# Patient Record
Sex: FEMALE | Race: WHITE | Employment: UNEMPLOYED | URBAN - METROPOLITAN AREA
[De-identification: names, ages, dates, MRNs, and addresses within clinical notes are randomized per-mention and may not be internally consistent; named-entity substitution may affect disease eponyms.]

---

## 2020-02-23 ENCOUNTER — ANESTHESIA (OUTPATIENT)
Dept: SURGERY | Age: 85
DRG: 470 | End: 2020-02-23
Payer: MEDICARE

## 2020-02-23 ENCOUNTER — ANESTHESIA EVENT (OUTPATIENT)
Dept: SURGERY | Age: 85
DRG: 470 | End: 2020-02-23
Payer: MEDICARE

## 2020-02-23 ENCOUNTER — HOSPITAL ENCOUNTER (INPATIENT)
Age: 85
LOS: 3 days | Discharge: NURSING, REHAB OR LONG-TERM CARE | DRG: 470 | End: 2020-02-26
Attending: EMERGENCY MEDICINE | Admitting: INTERNAL MEDICINE
Payer: MEDICARE

## 2020-02-23 ENCOUNTER — APPOINTMENT (OUTPATIENT)
Dept: GENERAL RADIOLOGY | Age: 85
DRG: 470 | End: 2020-02-23
Attending: EMERGENCY MEDICINE
Payer: MEDICARE

## 2020-02-23 DIAGNOSIS — S72.001A CLOSED FRACTURE OF RIGHT HIP, INITIAL ENCOUNTER (HCC): Primary | ICD-10-CM

## 2020-02-23 PROBLEM — S72.009A HIP FRACTURE (HCC): Status: ACTIVE | Noted: 2020-02-23

## 2020-02-23 PROBLEM — F03.90 DEMENTIA (HCC): Status: ACTIVE | Noted: 2020-02-23

## 2020-02-23 LAB
ABO + RH BLD: NORMAL
ALBUMIN SERPL-MCNC: 3.2 G/DL (ref 3.2–4.6)
ALBUMIN SERPL-MCNC: 3.2 G/DL (ref 3.2–4.6)
ALBUMIN/GLOB SERPL: 0.8 {RATIO} (ref 1.2–3.5)
ALBUMIN/GLOB SERPL: 0.9 {RATIO} (ref 1.2–3.5)
ALP SERPL-CCNC: 54 U/L (ref 50–136)
ALP SERPL-CCNC: 56 U/L (ref 50–136)
ALT SERPL-CCNC: 13 U/L (ref 12–65)
ALT SERPL-CCNC: 18 U/L (ref 12–65)
ANION GAP SERPL CALC-SCNC: 5 MMOL/L (ref 7–16)
ANION GAP SERPL CALC-SCNC: 6 MMOL/L (ref 7–16)
AST SERPL-CCNC: 16 U/L (ref 15–37)
AST SERPL-CCNC: 37 U/L (ref 15–37)
ATRIAL RATE: 91 BPM
BASOPHILS # BLD: 0 K/UL (ref 0–0.2)
BASOPHILS # BLD: 0.1 K/UL (ref 0–0.2)
BASOPHILS NFR BLD: 0 % (ref 0–2)
BASOPHILS NFR BLD: 1 % (ref 0–2)
BILIRUB SERPL-MCNC: 1.2 MG/DL (ref 0.2–1.1)
BILIRUB SERPL-MCNC: 1.4 MG/DL (ref 0.2–1.1)
BLOOD GROUP ANTIBODIES SERPL: NORMAL
BUN SERPL-MCNC: 7 MG/DL (ref 8–23)
BUN SERPL-MCNC: 9 MG/DL (ref 8–23)
CALCIUM SERPL-MCNC: 8.9 MG/DL (ref 8.3–10.4)
CALCIUM SERPL-MCNC: 8.9 MG/DL (ref 8.3–10.4)
CALCULATED P AXIS, ECG09: 63 DEGREES
CALCULATED R AXIS, ECG10: 29 DEGREES
CALCULATED T AXIS, ECG11: 79 DEGREES
CHLORIDE SERPL-SCNC: 105 MMOL/L (ref 98–107)
CHLORIDE SERPL-SCNC: 108 MMOL/L (ref 98–107)
CO2 SERPL-SCNC: 25 MMOL/L (ref 21–32)
CO2 SERPL-SCNC: 30 MMOL/L (ref 21–32)
CREAT SERPL-MCNC: 0.51 MG/DL (ref 0.6–1)
CREAT SERPL-MCNC: 0.58 MG/DL (ref 0.6–1)
DIAGNOSIS, 93000: NORMAL
DIFFERENTIAL METHOD BLD: ABNORMAL
DIFFERENTIAL METHOD BLD: ABNORMAL
EOSINOPHIL # BLD: 0.1 K/UL (ref 0–0.8)
EOSINOPHIL # BLD: 0.2 K/UL (ref 0–0.8)
EOSINOPHIL NFR BLD: 1 % (ref 0.5–7.8)
EOSINOPHIL NFR BLD: 1 % (ref 0.5–7.8)
ERYTHROCYTE [DISTWIDTH] IN BLOOD BY AUTOMATED COUNT: 13.7 % (ref 11.9–14.6)
ERYTHROCYTE [DISTWIDTH] IN BLOOD BY AUTOMATED COUNT: 13.7 % (ref 11.9–14.6)
ERYTHROCYTE [DISTWIDTH] IN BLOOD BY AUTOMATED COUNT: 13.8 % (ref 11.9–14.6)
GLOBULIN SER CALC-MCNC: 3.5 G/DL (ref 2.3–3.5)
GLOBULIN SER CALC-MCNC: 3.8 G/DL (ref 2.3–3.5)
GLUCOSE SERPL-MCNC: 106 MG/DL (ref 65–100)
GLUCOSE SERPL-MCNC: 116 MG/DL (ref 65–100)
HCT VFR BLD AUTO: 42 % (ref 35.8–46.3)
HCT VFR BLD AUTO: 43.4 % (ref 35.8–46.3)
HCT VFR BLD AUTO: 44.9 % (ref 35.8–46.3)
HGB BLD-MCNC: 14 G/DL (ref 11.7–15.4)
HGB BLD-MCNC: 14.6 G/DL (ref 11.7–15.4)
HGB BLD-MCNC: 14.7 G/DL (ref 11.7–15.4)
IMM GRANULOCYTES # BLD AUTO: 0 K/UL (ref 0–0.5)
IMM GRANULOCYTES # BLD AUTO: 0.1 K/UL (ref 0–0.5)
IMM GRANULOCYTES NFR BLD AUTO: 0 % (ref 0–5)
IMM GRANULOCYTES NFR BLD AUTO: 1 % (ref 0–5)
INR PPP: 1.1
LYMPHOCYTES # BLD: 1.1 K/UL (ref 0.5–4.6)
LYMPHOCYTES # BLD: 2 K/UL (ref 0.5–4.6)
LYMPHOCYTES NFR BLD: 11 % (ref 13–44)
LYMPHOCYTES NFR BLD: 15 % (ref 13–44)
MAGNESIUM SERPL-MCNC: 2.1 MG/DL (ref 1.8–2.4)
MCH RBC QN AUTO: 32.9 PG (ref 26.1–32.9)
MCH RBC QN AUTO: 33 PG (ref 26.1–32.9)
MCH RBC QN AUTO: 33.1 PG (ref 26.1–32.9)
MCHC RBC AUTO-ENTMCNC: 32.7 G/DL (ref 31.4–35)
MCHC RBC AUTO-ENTMCNC: 33.3 G/DL (ref 31.4–35)
MCHC RBC AUTO-ENTMCNC: 33.6 G/DL (ref 31.4–35)
MCV RBC AUTO: 100.7 FL (ref 79.6–97.8)
MCV RBC AUTO: 98.4 FL (ref 79.6–97.8)
MCV RBC AUTO: 98.8 FL (ref 79.6–97.8)
MONOCYTES # BLD: 0.5 K/UL (ref 0.1–1.3)
MONOCYTES # BLD: 0.7 K/UL (ref 0.1–1.3)
MONOCYTES NFR BLD: 5 % (ref 4–12)
MONOCYTES NFR BLD: 5 % (ref 4–12)
NEUTS SEG # BLD: 10 K/UL (ref 1.7–8.2)
NEUTS SEG # BLD: 8.2 K/UL (ref 1.7–8.2)
NEUTS SEG NFR BLD: 77 % (ref 43–78)
NEUTS SEG NFR BLD: 83 % (ref 43–78)
NRBC # BLD: 0 K/UL (ref 0–0.2)
P-R INTERVAL, ECG05: 196 MS
PLATELET # BLD AUTO: 219 K/UL (ref 150–450)
PLATELET # BLD AUTO: 247 K/UL (ref 150–450)
PLATELET # BLD AUTO: 262 K/UL (ref 150–450)
PMV BLD AUTO: 10.6 FL (ref 9.4–12.3)
PMV BLD AUTO: 10.7 FL (ref 9.4–12.3)
PMV BLD AUTO: 11.5 FL (ref 9.4–12.3)
POTASSIUM SERPL-SCNC: 3.7 MMOL/L (ref 3.5–5.1)
POTASSIUM SERPL-SCNC: 4.5 MMOL/L (ref 3.5–5.1)
PROT SERPL-MCNC: 6.7 G/DL (ref 6.3–8.2)
PROT SERPL-MCNC: 7 G/DL (ref 6.3–8.2)
PROTHROMBIN TIME: 14.2 SEC (ref 12–14.7)
Q-T INTERVAL, ECG07: 424 MS
QRS DURATION, ECG06: 84 MS
QTC CALCULATION (BEZET), ECG08: 521 MS
RBC # BLD AUTO: 4.25 M/UL (ref 4.05–5.2)
RBC # BLD AUTO: 4.41 M/UL (ref 4.05–5.2)
RBC # BLD AUTO: 4.46 M/UL (ref 4.05–5.2)
SODIUM SERPL-SCNC: 139 MMOL/L (ref 136–145)
SODIUM SERPL-SCNC: 140 MMOL/L (ref 136–145)
SPECIMEN EXP DATE BLD: NORMAL
VENTRICULAR RATE, ECG03: 91 BPM
WBC # BLD AUTO: 10 K/UL (ref 4.3–11.1)
WBC # BLD AUTO: 13 K/UL (ref 4.3–11.1)
WBC # BLD AUTO: 9.7 K/UL (ref 4.3–11.1)

## 2020-02-23 PROCEDURE — 86580 TB INTRADERMAL TEST: CPT | Performed by: ORTHOPAEDIC SURGERY

## 2020-02-23 PROCEDURE — 99284 EMERGENCY DEPT VISIT MOD MDM: CPT

## 2020-02-23 PROCEDURE — 74011250637 HC RX REV CODE- 250/637: Performed by: INTERNAL MEDICINE

## 2020-02-23 PROCEDURE — 74011000250 HC RX REV CODE- 250

## 2020-02-23 PROCEDURE — 36415 COLL VENOUS BLD VENIPUNCTURE: CPT

## 2020-02-23 PROCEDURE — 83735 ASSAY OF MAGNESIUM: CPT

## 2020-02-23 PROCEDURE — 77030002933 HC SUT MCRYL J&J -A: Performed by: ORTHOPAEDIC SURGERY

## 2020-02-23 PROCEDURE — 93005 ELECTROCARDIOGRAM TRACING: CPT | Performed by: EMERGENCY MEDICINE

## 2020-02-23 PROCEDURE — 77030018836 HC SOL IRR NACL ICUM -A: Performed by: ORTHOPAEDIC SURGERY

## 2020-02-23 PROCEDURE — 86900 BLOOD TYPING SEROLOGIC ABO: CPT

## 2020-02-23 PROCEDURE — 77030038269 HC DRN EXT URIN PURWCK BARD -A

## 2020-02-23 PROCEDURE — 51702 INSERT TEMP BLADDER CATH: CPT

## 2020-02-23 PROCEDURE — 71045 X-RAY EXAM CHEST 1 VIEW: CPT

## 2020-02-23 PROCEDURE — 77030041279 HC DRSG PRMSL AG MDII -B: Performed by: ORTHOPAEDIC SURGERY

## 2020-02-23 PROCEDURE — 80053 COMPREHEN METABOLIC PANEL: CPT

## 2020-02-23 PROCEDURE — 94760 N-INVAS EAR/PLS OXIMETRY 1: CPT

## 2020-02-23 PROCEDURE — 85027 COMPLETE CBC AUTOMATED: CPT

## 2020-02-23 PROCEDURE — 74011250637 HC RX REV CODE- 250/637: Performed by: ORTHOPAEDIC SURGERY

## 2020-02-23 PROCEDURE — 73552 X-RAY EXAM OF FEMUR 2/>: CPT

## 2020-02-23 PROCEDURE — 77030002937 HC SUT MERS J&J -B: Performed by: ORTHOPAEDIC SURGERY

## 2020-02-23 PROCEDURE — 74011250636 HC RX REV CODE- 250/636: Performed by: ANESTHESIOLOGY

## 2020-02-23 PROCEDURE — 74011000302 HC RX REV CODE- 302: Performed by: ORTHOPAEDIC SURGERY

## 2020-02-23 PROCEDURE — 0SRR0JZ REPLACEMENT OF RIGHT HIP JOINT, FEMORAL SURFACE WITH SYNTHETIC SUBSTITUTE, OPEN APPROACH: ICD-10-PCS | Performed by: ORTHOPAEDIC SURGERY

## 2020-02-23 PROCEDURE — L1830 KO IMMOB CANVAS LONG PRE OTS: HCPCS | Performed by: ORTHOPAEDIC SURGERY

## 2020-02-23 PROCEDURE — 74011250636 HC RX REV CODE- 250/636: Performed by: EMERGENCY MEDICINE

## 2020-02-23 PROCEDURE — 74011250636 HC RX REV CODE- 250/636: Performed by: ORTHOPAEDIC SURGERY

## 2020-02-23 PROCEDURE — 87641 MR-STAPH DNA AMP PROBE: CPT

## 2020-02-23 PROCEDURE — 76210000006 HC OR PH I REC 0.5 TO 1 HR: Performed by: ORTHOPAEDIC SURGERY

## 2020-02-23 PROCEDURE — 77030039425 HC BLD LARYNG TRULITE DISP TELE -A: Performed by: ANESTHESIOLOGY

## 2020-02-23 PROCEDURE — 96374 THER/PROPH/DIAG INJ IV PUSH: CPT

## 2020-02-23 PROCEDURE — 96375 TX/PRO/DX INJ NEW DRUG ADDON: CPT

## 2020-02-23 PROCEDURE — 74011000250 HC RX REV CODE- 250: Performed by: ORTHOPAEDIC SURGERY

## 2020-02-23 PROCEDURE — 76060000033 HC ANESTHESIA 1 TO 1.5 HR: Performed by: ORTHOPAEDIC SURGERY

## 2020-02-23 PROCEDURE — 73502 X-RAY EXAM HIP UNI 2-3 VIEWS: CPT

## 2020-02-23 PROCEDURE — 65660000000 HC RM CCU STEPDOWN

## 2020-02-23 PROCEDURE — 77030039267 HC ADH SKN EXOFIN S2SG -B: Performed by: ORTHOPAEDIC SURGERY

## 2020-02-23 PROCEDURE — 74011000258 HC RX REV CODE- 258: Performed by: ORTHOPAEDIC SURGERY

## 2020-02-23 PROCEDURE — 77030006835 HC BLD SAW SAG STRY -B: Performed by: ORTHOPAEDIC SURGERY

## 2020-02-23 PROCEDURE — 76010000161 HC OR TIME 1 TO 1.5 HR INTENSV-TIER 1: Performed by: ORTHOPAEDIC SURGERY

## 2020-02-23 PROCEDURE — 77030037088 HC TUBE ENDOTRACH ORAL NSL COVD-A: Performed by: ANESTHESIOLOGY

## 2020-02-23 PROCEDURE — 85610 PROTHROMBIN TIME: CPT

## 2020-02-23 PROCEDURE — C1776 JOINT DEVICE (IMPLANTABLE): HCPCS | Performed by: ORTHOPAEDIC SURGERY

## 2020-02-23 PROCEDURE — 77030031139 HC SUT VCRL2 J&J -A: Performed by: ORTHOPAEDIC SURGERY

## 2020-02-23 PROCEDURE — 74011000250 HC RX REV CODE- 250: Performed by: ANESTHESIOLOGY

## 2020-02-23 PROCEDURE — 77010033678 HC OXYGEN DAILY

## 2020-02-23 PROCEDURE — 85025 COMPLETE CBC W/AUTO DIFF WBC: CPT

## 2020-02-23 PROCEDURE — 77030013727 HC IRR FAN PULSVC ZIMM -B: Performed by: ORTHOPAEDIC SURGERY

## 2020-02-23 DEVICE — HEAD BPLR OD48MM ID28MM FEM HIP SELF CNTR: Type: IMPLANTABLE DEVICE | Site: HIP | Status: FUNCTIONAL

## 2020-02-23 DEVICE — HEAD FEM DIA28MM +5MM OFFSET STD 12/14 TAPR ARTC EZ HIP MTL: Type: IMPLANTABLE DEVICE | Site: HIP | Status: FUNCTIONAL

## 2020-02-23 RX ORDER — SODIUM CHLORIDE, SODIUM LACTATE, POTASSIUM CHLORIDE, CALCIUM CHLORIDE 600; 310; 30; 20 MG/100ML; MG/100ML; MG/100ML; MG/100ML
INJECTION, SOLUTION INTRAVENOUS
Status: DISCONTINUED | OUTPATIENT
Start: 2020-02-23 | End: 2020-02-23 | Stop reason: HOSPADM

## 2020-02-23 RX ORDER — LIDOCAINE HYDROCHLORIDE 20 MG/ML
INJECTION, SOLUTION EPIDURAL; INFILTRATION; INTRACAUDAL; PERINEURAL AS NEEDED
Status: DISCONTINUED | OUTPATIENT
Start: 2020-02-23 | End: 2020-02-23 | Stop reason: HOSPADM

## 2020-02-23 RX ORDER — SODIUM CHLORIDE 0.9 % (FLUSH) 0.9 %
5-40 SYRINGE (ML) INJECTION AS NEEDED
Status: DISCONTINUED | OUTPATIENT
Start: 2020-02-23 | End: 2020-02-25 | Stop reason: SDUPTHER

## 2020-02-23 RX ORDER — CEFAZOLIN SODIUM/WATER 2 G/20 ML
2 SYRINGE (ML) INTRAVENOUS ONCE
Status: DISCONTINUED | OUTPATIENT
Start: 2020-02-23 | End: 2020-02-23 | Stop reason: SDUPTHER

## 2020-02-23 RX ORDER — PROPOFOL 10 MG/ML
INJECTION, EMULSION INTRAVENOUS AS NEEDED
Status: DISCONTINUED | OUTPATIENT
Start: 2020-02-23 | End: 2020-02-23 | Stop reason: HOSPADM

## 2020-02-23 RX ORDER — MEMANTINE HYDROCHLORIDE 5 MG/1
10 TABLET ORAL 2 TIMES DAILY
Status: DISCONTINUED | OUTPATIENT
Start: 2020-02-23 | End: 2020-02-26 | Stop reason: HOSPADM

## 2020-02-23 RX ORDER — ONDANSETRON 2 MG/ML
4 INJECTION INTRAMUSCULAR; INTRAVENOUS
Status: COMPLETED | OUTPATIENT
Start: 2020-02-23 | End: 2020-02-23

## 2020-02-23 RX ORDER — ACETAMINOPHEN 325 MG/1
650 TABLET ORAL EVERY 8 HOURS
Status: DISCONTINUED | OUTPATIENT
Start: 2020-02-23 | End: 2020-02-26 | Stop reason: HOSPADM

## 2020-02-23 RX ORDER — SUCCINYLCHOLINE CHLORIDE 20 MG/ML
INJECTION INTRAMUSCULAR; INTRAVENOUS AS NEEDED
Status: DISCONTINUED | OUTPATIENT
Start: 2020-02-23 | End: 2020-02-23 | Stop reason: HOSPADM

## 2020-02-23 RX ORDER — SODIUM CHLORIDE, SODIUM LACTATE, POTASSIUM CHLORIDE, CALCIUM CHLORIDE 600; 310; 30; 20 MG/100ML; MG/100ML; MG/100ML; MG/100ML
25 INJECTION, SOLUTION INTRAVENOUS CONTINUOUS
Status: DISCONTINUED | OUTPATIENT
Start: 2020-02-23 | End: 2020-02-24

## 2020-02-23 RX ORDER — ONDANSETRON 2 MG/ML
4 INJECTION INTRAMUSCULAR; INTRAVENOUS
Status: DISCONTINUED | OUTPATIENT
Start: 2020-02-23 | End: 2020-02-26 | Stop reason: HOSPADM

## 2020-02-23 RX ORDER — MEMANTINE HYDROCHLORIDE 5 MG/1
5 TABLET ORAL 2 TIMES DAILY
Status: DISCONTINUED | OUTPATIENT
Start: 2020-02-23 | End: 2020-02-24

## 2020-02-23 RX ORDER — FENTANYL CITRATE 50 UG/ML
INJECTION, SOLUTION INTRAMUSCULAR; INTRAVENOUS AS NEEDED
Status: DISCONTINUED | OUTPATIENT
Start: 2020-02-23 | End: 2020-02-23 | Stop reason: HOSPADM

## 2020-02-23 RX ORDER — SODIUM CHLORIDE 0.9 % (FLUSH) 0.9 %
5-40 SYRINGE (ML) INJECTION EVERY 8 HOURS
Status: DISCONTINUED | OUTPATIENT
Start: 2020-02-23 | End: 2020-02-26 | Stop reason: HOSPADM

## 2020-02-23 RX ORDER — ROCURONIUM BROMIDE 10 MG/ML
INJECTION, SOLUTION INTRAVENOUS AS NEEDED
Status: DISCONTINUED | OUTPATIENT
Start: 2020-02-23 | End: 2020-02-23 | Stop reason: HOSPADM

## 2020-02-23 RX ORDER — HYDROCODONE BITARTRATE AND ACETAMINOPHEN 7.5; 325 MG/1; MG/1
1 TABLET ORAL
Status: DISCONTINUED | OUTPATIENT
Start: 2020-02-23 | End: 2020-02-25

## 2020-02-23 RX ORDER — MAG HYDROX/ALUMINUM HYD/SIMETH 200-200-20
30 SUSPENSION, ORAL (FINAL DOSE FORM) ORAL
Status: DISCONTINUED | OUTPATIENT
Start: 2020-02-23 | End: 2020-02-26 | Stop reason: HOSPADM

## 2020-02-23 RX ORDER — ACETAMINOPHEN 10 MG/ML
1000 INJECTION, SOLUTION INTRAVENOUS
Status: COMPLETED | OUTPATIENT
Start: 2020-02-23 | End: 2020-02-23

## 2020-02-23 RX ORDER — SODIUM CHLORIDE 9 MG/ML
75 INJECTION, SOLUTION INTRAVENOUS CONTINUOUS
Status: DISCONTINUED | OUTPATIENT
Start: 2020-02-23 | End: 2020-02-24

## 2020-02-23 RX ORDER — SODIUM CHLORIDE 0.9 % (FLUSH) 0.9 %
5-40 SYRINGE (ML) INJECTION EVERY 8 HOURS
Status: DISCONTINUED | OUTPATIENT
Start: 2020-02-23 | End: 2020-02-25 | Stop reason: SDUPTHER

## 2020-02-23 RX ORDER — OXYCODONE HYDROCHLORIDE 5 MG/1
10 TABLET ORAL
Status: DISCONTINUED | OUTPATIENT
Start: 2020-02-23 | End: 2020-02-25

## 2020-02-23 RX ORDER — METOPROLOL TARTRATE 5 MG/5ML
INJECTION INTRAVENOUS
Status: COMPLETED
Start: 2020-02-23 | End: 2020-02-23

## 2020-02-23 RX ORDER — SODIUM CHLORIDE, SODIUM LACTATE, POTASSIUM CHLORIDE, CALCIUM CHLORIDE 600; 310; 30; 20 MG/100ML; MG/100ML; MG/100ML; MG/100ML
50 INJECTION, SOLUTION INTRAVENOUS CONTINUOUS
Status: DISCONTINUED | OUTPATIENT
Start: 2020-02-23 | End: 2020-02-24

## 2020-02-23 RX ORDER — METOPROLOL TARTRATE 5 MG/5ML
2 INJECTION INTRAVENOUS ONCE
Status: COMPLETED | OUTPATIENT
Start: 2020-02-23 | End: 2020-02-23

## 2020-02-23 RX ORDER — OXYCODONE HYDROCHLORIDE 5 MG/1
5 TABLET ORAL
Status: DISCONTINUED | OUTPATIENT
Start: 2020-02-23 | End: 2020-02-25

## 2020-02-23 RX ORDER — MORPHINE SULFATE 2 MG/ML
2 INJECTION, SOLUTION INTRAMUSCULAR; INTRAVENOUS
Status: COMPLETED | OUTPATIENT
Start: 2020-02-23 | End: 2020-02-23

## 2020-02-23 RX ORDER — ENOXAPARIN SODIUM 100 MG/ML
30 INJECTION SUBCUTANEOUS DAILY
Status: DISCONTINUED | OUTPATIENT
Start: 2020-02-24 | End: 2020-02-26 | Stop reason: HOSPADM

## 2020-02-23 RX ORDER — HYDROMORPHONE HYDROCHLORIDE 1 MG/ML
0.2 INJECTION, SOLUTION INTRAMUSCULAR; INTRAVENOUS; SUBCUTANEOUS
Status: DISCONTINUED | OUTPATIENT
Start: 2020-02-23 | End: 2020-02-25

## 2020-02-23 RX ORDER — CEFAZOLIN SODIUM/WATER 2 G/20 ML
SYRINGE (ML) INTRAVENOUS AS NEEDED
Status: DISCONTINUED | OUTPATIENT
Start: 2020-02-23 | End: 2020-02-23 | Stop reason: HOSPADM

## 2020-02-23 RX ORDER — DONEPEZIL HYDROCHLORIDE 5 MG/1
10 TABLET, FILM COATED ORAL
Status: DISCONTINUED | OUTPATIENT
Start: 2020-02-23 | End: 2020-02-24

## 2020-02-23 RX ORDER — FERROUS SULFATE, DRIED 160(50) MG
1 TABLET, EXTENDED RELEASE ORAL
Status: DISCONTINUED | OUTPATIENT
Start: 2020-02-23 | End: 2020-02-26 | Stop reason: HOSPADM

## 2020-02-23 RX ORDER — ACETAMINOPHEN 325 MG/1
650 TABLET ORAL
Status: DISCONTINUED | OUTPATIENT
Start: 2020-02-23 | End: 2020-02-26 | Stop reason: HOSPADM

## 2020-02-23 RX ORDER — HYDROMORPHONE HYDROCHLORIDE 1 MG/ML
0.5 INJECTION, SOLUTION INTRAMUSCULAR; INTRAVENOUS; SUBCUTANEOUS
Status: DISCONTINUED | OUTPATIENT
Start: 2020-02-23 | End: 2020-02-25

## 2020-02-23 RX ORDER — SODIUM CHLORIDE 0.9 % (FLUSH) 0.9 %
5-40 SYRINGE (ML) INJECTION AS NEEDED
Status: DISCONTINUED | OUTPATIENT
Start: 2020-02-23 | End: 2020-02-26 | Stop reason: HOSPADM

## 2020-02-23 RX ORDER — SODIUM CHLORIDE 9 MG/ML
100 INJECTION, SOLUTION INTRAVENOUS CONTINUOUS
Status: DISPENSED | OUTPATIENT
Start: 2020-02-23 | End: 2020-02-24

## 2020-02-23 RX ADMIN — Medication 1 TABLET: at 19:07

## 2020-02-23 RX ADMIN — ACETAMINOPHEN 650 MG: 325 TABLET, FILM COATED ORAL at 19:06

## 2020-02-23 RX ADMIN — TUBERCULIN PURIFIED PROTEIN DERIVATIVE 5 UNITS: 5 INJECTION, SOLUTION INTRADERMAL at 23:59

## 2020-02-23 RX ADMIN — Medication 5 ML: at 21:57

## 2020-02-23 RX ADMIN — SODIUM CHLORIDE 1000 MG: 900 INJECTION, SOLUTION INTRAVENOUS at 21:57

## 2020-02-23 RX ADMIN — Medication 5 ML: at 19:10

## 2020-02-23 RX ADMIN — FENTANYL CITRATE 25 MCG: 50 INJECTION INTRAMUSCULAR; INTRAVENOUS at 14:48

## 2020-02-23 RX ADMIN — PHENYLEPHRINE HYDROCHLORIDE 100 MCG: 10 INJECTION INTRAVENOUS at 13:48

## 2020-02-23 RX ADMIN — ROCURONIUM BROMIDE 10 MG: 10 INJECTION, SOLUTION INTRAVENOUS at 13:45

## 2020-02-23 RX ADMIN — MEMANTINE 10 MG: 5 TABLET ORAL at 19:06

## 2020-02-23 RX ADMIN — METOPROLOL TARTRATE 2 MG: 5 INJECTION INTRAVENOUS at 15:10

## 2020-02-23 RX ADMIN — MEMANTINE 10 MG: 5 TABLET ORAL at 19:10

## 2020-02-23 RX ADMIN — PROPOFOL 70 MG: 10 INJECTION, EMULSION INTRAVENOUS at 13:43

## 2020-02-23 RX ADMIN — SUCCINYLCHOLINE CHLORIDE 140 MG: 20 INJECTION, SOLUTION INTRAMUSCULAR; INTRAVENOUS at 13:43

## 2020-02-23 RX ADMIN — ONDANSETRON 4 MG: 2 INJECTION INTRAMUSCULAR; INTRAVENOUS at 08:42

## 2020-02-23 RX ADMIN — MEMANTINE 5 MG: 5 TABLET ORAL at 19:07

## 2020-02-23 RX ADMIN — PHENYLEPHRINE HYDROCHLORIDE 100 MCG: 10 INJECTION INTRAVENOUS at 13:44

## 2020-02-23 RX ADMIN — ROCURONIUM BROMIDE 5 MG: 10 INJECTION, SOLUTION INTRAVENOUS at 13:43

## 2020-02-23 RX ADMIN — MORPHINE SULFATE 2 MG: 2 INJECTION, SOLUTION INTRAMUSCULAR; INTRAVENOUS at 08:42

## 2020-02-23 RX ADMIN — SODIUM CHLORIDE, SODIUM LACTATE, POTASSIUM CHLORIDE, AND CALCIUM CHLORIDE 25 ML/HR: 600; 310; 30; 20 INJECTION, SOLUTION INTRAVENOUS at 12:56

## 2020-02-23 RX ADMIN — ACETAMINOPHEN 1000 MG: 10 INJECTION, SOLUTION INTRAVENOUS at 15:09

## 2020-02-23 RX ADMIN — LIDOCAINE HYDROCHLORIDE 100 MG: 20 INJECTION, SOLUTION EPIDURAL; INFILTRATION; INTRACAUDAL; PERINEURAL at 13:43

## 2020-02-23 RX ADMIN — SODIUM CHLORIDE, SODIUM LACTATE, POTASSIUM CHLORIDE, AND CALCIUM CHLORIDE: 600; 310; 30; 20 INJECTION, SOLUTION INTRAVENOUS at 13:49

## 2020-02-23 RX ADMIN — FENTANYL CITRATE 25 MCG: 50 INJECTION INTRAMUSCULAR; INTRAVENOUS at 14:34

## 2020-02-23 RX ADMIN — Medication 2 G: at 13:46

## 2020-02-23 RX ADMIN — DONEPEZIL HYDROCHLORIDE 10 MG: 5 TABLET, FILM COATED ORAL at 21:57

## 2020-02-23 RX ADMIN — FENTANYL CITRATE 50 MCG: 50 INJECTION INTRAMUSCULAR; INTRAVENOUS at 14:06

## 2020-02-23 RX ADMIN — SODIUM CHLORIDE, SODIUM LACTATE, POTASSIUM CHLORIDE, AND CALCIUM CHLORIDE 50 ML/HR: 600; 310; 30; 20 INJECTION, SOLUTION INTRAVENOUS at 19:08

## 2020-02-23 NOTE — ED NOTES
Multiple unsuccessful attempts for Gray catheter placement by this RN and 2 other RNs due to patient's pain due to fracture. Purewick placed.
Patient remains in radiology at this time.
Patient reports received from Trisha Ace, [address] [place] [address]. Care assumed at this time.
Pt to xray in stretcher with transport at this time.
TRANSFER - OUT REPORT:    Verbal report given to Mohit Cruz RN (name) on Gala Kovacs  being transferred to 716(unit) for routine progression of care       Report consisted of patients Situation, Background, Assessment and   Recommendations(SBAR). Information from the following report(s) SBAR, ED Summary and MAR was reviewed with the receiving nurse. Lines:   Peripheral IV 02/23/20 Right Hand (Active)   Site Assessment Clean, dry, & intact 2/23/2020  8:07 AM   Phlebitis Assessment 0 2/23/2020  8:07 AM   Infiltration Assessment 0 2/23/2020  8:07 AM   Dressing Status Clean, dry, & intact 2/23/2020  8:07 AM        Opportunity for questions and clarification was provided.
This RN with two other RN's with multiple attempts at jones catheter. Unsuccessful. Purewick placed on pt at this time.
Verbal report to Nataly Welsh RN for continuation of care. Care transferred at this time.
[id number]

## 2020-02-23 NOTE — CONSULTS
FRACTURE CONSULT NOTE    Subjective:     Date of Consultation:  February 23, 2020  Referring Physician:  Krystal Jane    This is an 51-year-old female patient with a past medical history of dementia, aortic stenosis, resident of a nursing home who had a mechanical fall on 2/22/2020. After the fall it was noticed that the patient was not able to move the right lower extremity and it was internally rotated and shortened. A chest x-ray was done today and reported a right hip fracture. The patient was sent into the emergency room to be evaluated here.     She was found to have a blood pressure of 181/98, heart rate of 97, respiratory rate of 18, O2 saturation of 96% at room air. The patient is hard of hearing. Her lungs were clear to auscultation. He had a regular rate and rhythm. Her abdomen was soft and nontender. Her right lower extremity was internally rotated and shortened. Her initial blood work-up reported normal white blood cell count, stable hemoglobin, stable electrolytes, creatinine of 0.5, bilirubin of 1.4, normal liver enzymes, chest x-ray showed no evidence of acute intrathoracic abnormality. A right hip x-ray showed right proximal femur basicervical femoral neck fracture. The patient was presented to the hospitalist team for admission. Long discussion with family regarding operative and non operative care  Patient Active Problem List    Diagnosis Date Noted    Hip fracture ([facility] [address] 02/23/2020    Dementia ([facility] [address] 02/23/2020    Aortic valve stenosis 05/12/2016     History reviewed. No pertinent family history. Social History     Tobacco Use    Smoking status: Never Smoker    Smokeless tobacco: Never Used   Substance Use Topics    Alcohol use: No     Alcohol/week: 0.0 standard drinks     Past Medical History:   Diagnosis Date    Aortic stenosis     Aortic valve stenosis 5/12/2016      History reviewed. No pertinent surgical history.    Prior to Admission medications    Medication Sig Start Date End Date Taking? Authorizing Provider   memantine ER (NAMENDA XR) 28 mg capsule Take  by mouth daily. Provider, Historical   donepezil (ARICEPT) 10 mg tablet Take 10 mg by mouth nightly. Provider, Historical   risedronate (ACTONEL) 150 mg tablet Take 150 mg by mouth every thirty (30) days.     Provider, Historical     Current Facility-Administered Medications   Medication Dose Route Frequency    memantine (NAMENDA) tablet 10 mg  10 mg Oral BID    sodium chloride 0.9 % bolus infusion 500 mL  500 mL IntraVENous ONCE    0.9% sodium chloride infusion  100 mL/hr IntraVENous CONTINUOUS    ondansetron (ZOFRAN) injection 4 mg  4 mg IntraVENous Q6H PRN    sodium chloride 0.9 % bolus infusion 250 mL  250 mL IntraVENous CONTINUOUS    sodium chloride (NS) flush 5-40 mL  5-40 mL IntraVENous Q8H    sodium chloride (NS) flush 5-40 mL  5-40 mL IntraVENous PRN    acetaminophen (TYLENOL) tablet 650 mg  650 mg Oral Q6H PRN    tuberculin injection 5 Units  5 Units IntraDERMal ONCE    oxyCODONE IR (ROXICODONE) tablet 5 mg  5 mg Oral Q6H PRN    HYDROmorphone (PF) (DILAUDID) injection 0.5 mg  0.5 mg IntraVENous Q3H PRN    famotidine (PF) (PEPCID) 20 mg in 0.9% sodium chloride 10 mL injection  20 mg IntraVENous Q24H      No Known Allergies     Review of Systems:  10 point ROS done and is negative except as noted in HPI      Mental Status: severe dementia    Objective:     Patient Vitals for the past 8 hrs:   BP Temp Pulse Resp SpO2 Height Weight   20 1228     97 %     20 1136 (!) 193/94 99.4 °F (37.4 °C) 94 19 93 %     20 1105     97 %     20 1104   94  96 %     20 1058 156/85  96  90 %     20 0831 (!) 159/101  92 20      20 0830   98 22 93 %     20 0802 (!) 181/98 98.4 °F (36.9 °C) 97 18 96 % 4' 11\" (1.499 m) 55.3 kg (122 lb)     Temp (24hrs), Av.9 °F (37.2 °C), Min:98.4 °F (36.9 °C), Max:99.4 °F (37.4 °C)      EXAM:   General:   Demented Eyes:  Sclera anicteric. Mouth/Throat: Mucous membranes normal, oral pharynx clear   Neck: Supple   Lungs:   Clear to auscultation bilaterally, good effort   CV:  Regular rate and rhythm,no murmur, click, rub or gallop   Abdomen:   Soft, non-tender. bowel sounds normal. non-distended   Extremities: No cyanosis or edema   Skin: Skin color, texture, turgor normal. no acute rash or lesions   Lymph nodes: Cervical and supraclavicular normal   Musculoskeletal:  Right hip painful. Leg externally rotated and short   Lines/Devices:     Psych:          Imaging Review: markedly displaced femoral neck    Labs:   Recent Results (from the past 24 hour(s))   CBC WITH AUTOMATED DIFF    Collection Time: 02/23/20  8:09 AM   Result Value Ref Range    WBC 10.0 4.3 - 11.1 K/uL    RBC 4.41 4.05 - 5.2 M/uL    HGB 14.6 11.7 - 15.4 g/dL    HCT 43.4 35.8 - 46.3 %    MCV 98.4 (H) 79.6 - 97.8 FL    MCH 33.1 (H) 26.1 - 32.9 PG    MCHC 33.6 31.4 - 35.0 g/dL    RDW 13.8 11.9 - 14.6 %    PLATELET 130 092 - 166 K/uL    MPV 11.5 9.4 - 12.3 FL    ABSOLUTE NRBC 0.00 0.0 - 0.2 K/uL    DF AUTOMATED      NEUTROPHILS 83 (H) 43 - 78 %    LYMPHOCYTES 11 (L) 13 - 44 %    MONOCYTES 5 4.0 - 12.0 %    EOSINOPHILS 1 0.5 - 7.8 %    BASOPHILS 0 0.0 - 2.0 %    IMMATURE GRANULOCYTES 0 0.0 - 5.0 %    ABS. NEUTROPHILS 8.2 1.7 - 8.2 K/UL    ABS. LYMPHOCYTES 1.1 0.5 - 4.6 K/UL    ABS. MONOCYTES 0.5 0.1 - 1.3 K/UL    ABS. EOSINOPHILS 0.1 0.0 - 0.8 K/UL    ABS. BASOPHILS 0.0 0.0 - 0.2 K/UL    ABS. IMM.  GRANS. 0.0 0.0 - 0.5 K/UL   METABOLIC PANEL, COMPREHENSIVE    Collection Time: 02/23/20  8:09 AM   Result Value Ref Range    Sodium 139 136 - 145 mmol/L    Potassium 4.5 3.5 - 5.1 mmol/L    Chloride 108 (H) 98 - 107 mmol/L    CO2 25 21 - 32 mmol/L    Anion gap 6 (L) 7 - 16 mmol/L    Glucose 106 (H) 65 - 100 mg/dL    BUN 7 (L) 8 - 23 MG/DL    Creatinine 0.51 (L) 0.6 - 1.0 MG/DL    GFR est AA >60 >60 ml/min/1.73m2    GFR est non-AA >60 >60 ml/min/1.73m2    Calcium 8.9 8.3 - 10.4 MG/DL    Bilirubin, total 1.4 (H) 0.2 - 1.1 MG/DL    ALT (SGPT) 18 12 - 65 U/L    AST (SGOT) 37 15 - 37 U/L    Alk. phosphatase 54 50 - 136 U/L    Protein, total 7.0 6.3 - 8.2 g/dL    Albumin 3.2 3.2 - 4.6 g/dL    Globulin 3.8 (H) 2.3 - 3.5 g/dL    A-G Ratio 0.8 (L) 1.2 - 3.5     PROTHROMBIN TIME + INR    Collection Time: 02/23/20  8:09 AM   Result Value Ref Range    Prothrombin time 14.2 12.0 - 14.7 sec    INR 1.1     TYPE & SCREEN    Collection Time: 02/23/20  8:09 AM   Result Value Ref Range    Crossmatch Expiration 02/26/2020     ABO/Rh(D) A POSITIVE     Antibody screen NEG    EKG, 12 LEAD, INITIAL    Collection Time: 02/23/20  8:10 AM   Result Value Ref Range    Ventricular Rate 91 BPM    Atrial Rate 91 BPM    P-R Interval 196 ms    QRS Duration 84 ms    Q-T Interval 424 ms    QTC Calculation (Bezet) 521 ms    Calculated P Axis 63 degrees    Calculated R Axis 29 degrees    Calculated T Axis 79 degrees    Diagnosis       !! AGE AND GENDER SPECIFIC ECG ANALYSIS !! Normal sinus rhythm  Left ventricular hypertrophy with repolarization abnormality  Septal infarct , age undetermined  Prolonged QT  Abnormal ECG  No previous ECGs available  Confirmed by Ladd Boeck MD (), HERNANDEZ VARGAS ([id number]) on 2/23/2020 9:46:17 AM           Impression:     Active Problems:    Hip fracture ([facility] [address] (2/23/2020)      Dementia ([facility] [address] (2/23/2020)        Plan:     Hemiarthroplasty of right hip  Discussed risks and alternatives. Family desires surgery although long term prognosis is poor.   Will proceed    Miley Beauchamp DO

## 2020-02-23 NOTE — ED PROVIDER NOTES
Patient with fall yesterday from nursing home. X-ray today shows right hip fracture. Patient has shortening and rotation of the right leg. Sent here for evaluation. The history is provided by the patient. No  was used. Hip Pain    This is a new problem. The current episode started yesterday. The problem occurs constantly. The problem has not changed since onset. The pain is present in the right hip. The quality of the pain is described as aching. The pain is mild. Associated symptoms include limited range of motion. The symptoms are aggravated by palpation. She has tried nothing for the symptoms. There has been a history of trauma. Past Medical History:   Diagnosis Date    Aortic stenosis     Aortic valve stenosis 5/12/2016       History reviewed. No pertinent surgical history. History reviewed. No pertinent family history.     Social History     Socioeconomic History    Marital status: UNKNOWN     Spouse name: Not on file    Number of children: Not on file    Years of education: Not on file    Highest education level: Not on file   Occupational History    Not on file   Social Needs    Financial resource strain: Not on file    Food insecurity:     Worry: Not on file     Inability: Not on file    Transportation needs:     Medical: Not on file     Non-medical: Not on file   Tobacco Use    Smoking status: Never Smoker    Smokeless tobacco: Never Used   Substance and Sexual Activity    Alcohol use: No     Alcohol/week: 0.0 standard drinks    Drug use: Not on file    Sexual activity: Not on file   Lifestyle    Physical activity:     Days per week: Not on file     Minutes per session: Not on file    Stress: Not on file   Relationships    Social connections:     Talks on phone: Not on file     Gets together: Not on file     Attends Oriental orthodox service: Not on file     Active member of club or organization: Not on file     Attends meetings of clubs or organizations: Not on file     Relationship status: Not on file    Intimate partner violence:     Fear of current or ex partner: Not on file     Emotionally abused: Not on file     Physically abused: Not on file     Forced sexual activity: Not on file   Other Topics Concern    Not on file   Social History Narrative    Not on file         ALLERGIES: Patient has no known allergies. Review of Systems   Unable to perform ROS: Dementia       Vitals:    02/23/20 0802   BP: (!) 181/98   Pulse: 97   Resp: 18   Temp: 98.4 °F (36.9 °C)   SpO2: 96%   Weight: 55.3 kg (122 lb)   Height: 4' 11\" (1.499 m)            Physical Exam  Constitutional:       Appearance: Normal appearance. She is well-developed. HENT:      Head: Normocephalic and atraumatic. Eyes:      Extraocular Movements: Extraocular movements intact. Pupils: Pupils are equal, round, and reactive to light. Neck:      Musculoskeletal: Normal range of motion and neck supple. Cardiovascular:      Rate and Rhythm: Normal rate and regular rhythm. Pulmonary:      Effort: Pulmonary effort is normal.      Breath sounds: Normal breath sounds. Abdominal:      General: Bowel sounds are normal.      Palpations: Abdomen is soft. Tenderness: There is no abdominal tenderness. Musculoskeletal:         General: Tenderness (right hip) present. Skin:     General: Skin is warm and dry. Neurological:      General: No focal deficit present. Mental Status: She is alert. Mental status is at baseline. MDM  Number of Diagnoses or Management Options  Diagnosis management comments: Patient with right hip fracture status post fall yesterday. Will admit for further treatment.        Amount and/or Complexity of Data Reviewed  Clinical lab tests: ordered and reviewed  Tests in the radiology section of CPT®: ordered and reviewed  Tests in the medicine section of CPT®: ordered and reviewed    Patient Progress  Patient progress: stable         Procedures      EKG: normal sinus rhythm, nonspecific ST and T waves changes. XR HIP RT W OR WO PELV 2-3 VWS (Final result)   Result time 02/23/20 09:35:24   Final result by Elba Clayton MD (02/23/20 09:35:24)                Impression:    IMPRESSION:  1. Right proximal femur basicervical femoral neck fracture. 2. No acute cardiopulmonary abnormality. Cardiomegaly suspected. Narrative:    Right hip, right femur, and chest x-ray    Clinical location right hip pain after a fall    FINDINGS:    Right hip: A bases cervical right proximal femoral neck fracture is appreciated  with superior migration of the diaphyseal fragment and varus angulation. Right femur: Four views of the right femur again show the right proximal femoral  neck fracture. No mid or distal femur fracture evident. The soft tissues are  unremarkable. Portable chest x-ray: The cardiac silhouette is prominent. The lungs are  expanded and clear. No pleural effusion or pneumothorax. The bones are  osteopenic.                    XR FEMUR RT 2 VS (Final result)   Result time 02/23/20 09:35:24   Final result by Elba Clayton MD (02/23/20 09:35:24)                Impression:    IMPRESSION:  1. Right proximal femur basicervical femoral neck fracture. 2. No acute cardiopulmonary abnormality. Cardiomegaly suspected. Narrative:    Right hip, right femur, and chest x-ray    Clinical location right hip pain after a fall    FINDINGS:    Right hip: A bases cervical right proximal femoral neck fracture is appreciated  with superior migration of the diaphyseal fragment and varus angulation. Right femur: Four views of the right femur again show the right proximal femoral  neck fracture. No mid or distal femur fracture evident. The soft tissues are  unremarkable. Portable chest x-ray: The cardiac silhouette is prominent. The lungs are  expanded and clear. No pleural effusion or pneumothorax.  The bones are  osteopenic.                    XR CHEST SNGL V (Final result)   Result time 02/23/20 09:35:24   Procedure changed from XR CHEST PA LAT   Final result by Yani Ramirez MD (02/23/20 09:35:24)                Impression:    IMPRESSION:  1. Right proximal femur basicervical femoral neck fracture. 2. No acute cardiopulmonary abnormality. Cardiomegaly suspected. Narrative:    Right hip, right femur, and chest x-ray    Clinical location right hip pain after a fall    FINDINGS:    Right hip: A bases cervical right proximal femoral neck fracture is appreciated  with superior migration of the diaphyseal fragment and varus angulation. Right femur: Four views of the right femur again show the right proximal femoral  neck fracture. No mid or distal femur fracture evident. The soft tissues are  unremarkable. Portable chest x-ray: The cardiac silhouette is prominent. The lungs are  expanded and clear. No pleural effusion or pneumothorax. The bones are  osteopenic.                  Results Include:    Recent Results (from the past 24 hour(s))   CBC WITH AUTOMATED DIFF    Collection Time: 02/23/20  8:09 AM   Result Value Ref Range    WBC 10.0 4.3 - 11.1 K/uL    RBC 4.41 4.05 - 5.2 M/uL    HGB 14.6 11.7 - 15.4 g/dL    HCT 43.4 35.8 - 46.3 %    MCV 98.4 (H) 79.6 - 97.8 FL    MCH 33.1 (H) 26.1 - 32.9 PG    MCHC 33.6 31.4 - 35.0 g/dL    RDW 13.8 11.9 - 14.6 %    PLATELET 215 526 - 076 K/uL    MPV 11.5 9.4 - 12.3 FL    ABSOLUTE NRBC 0.00 0.0 - 0.2 K/uL    DF AUTOMATED      NEUTROPHILS 83 (H) 43 - 78 %    LYMPHOCYTES 11 (L) 13 - 44 %    MONOCYTES 5 4.0 - 12.0 %    EOSINOPHILS 1 0.5 - 7.8 %    BASOPHILS 0 0.0 - 2.0 %    IMMATURE GRANULOCYTES 0 0.0 - 5.0 %    ABS. NEUTROPHILS 8.2 1.7 - 8.2 K/UL    ABS. LYMPHOCYTES 1.1 0.5 - 4.6 K/UL    ABS. MONOCYTES 0.5 0.1 - 1.3 K/UL    ABS. EOSINOPHILS 0.1 0.0 - 0.8 K/UL    ABS. BASOPHILS 0.0 0.0 - 0.2 K/UL    ABS. IMM.  GRANS. 0.0 0.0 - 0.5 K/UL   METABOLIC PANEL, COMPREHENSIVE    Collection Time: 02/23/20  8:09 AM Result Value Ref Range    Sodium 139 136 - 145 mmol/L    Potassium 4.5 3.5 - 5.1 mmol/L    Chloride 108 (H) 98 - 107 mmol/L    CO2 25 21 - 32 mmol/L    Anion gap 6 (L) 7 - 16 mmol/L    Glucose 106 (H) 65 - 100 mg/dL    BUN 7 (L) 8 - 23 MG/DL    Creatinine 0.51 (L) 0.6 - 1.0 MG/DL    GFR est AA >60 >60 ml/min/1.73m2    GFR est non-AA >60 >60 ml/min/1.73m2    Calcium 8.9 8.3 - 10.4 MG/DL    Bilirubin, total 1.4 (H) 0.2 - 1.1 MG/DL    ALT (SGPT) 18 12 - 65 U/L    AST (SGOT) 37 15 - 37 U/L    Alk. phosphatase 54 50 - 136 U/L    Protein, total 7.0 6.3 - 8.2 g/dL    Albumin 3.2 3.2 - 4.6 g/dL    Globulin 3.8 (H) 2.3 - 3.5 g/dL    A-G Ratio 0.8 (L) 1.2 - 3.5     PROTHROMBIN TIME + INR    Collection Time: 02/23/20  8:09 AM   Result Value Ref Range    Prothrombin time 14.2 12.0 - 14.7 sec    INR 1.1     TYPE & SCREEN    Collection Time: 02/23/20  8:09 AM   Result Value Ref Range    Crossmatch Expiration 02/26/2020     ABO/Rh(D) A POSITIVE     Antibody screen NEG    EKG, 12 LEAD, INITIAL    Collection Time: 02/23/20  8:10 AM   Result Value Ref Range    Ventricular Rate 91 BPM    Atrial Rate 91 BPM    P-R Interval 196 ms    QRS Duration 84 ms    Q-T Interval 424 ms    QTC Calculation (Bezet) 521 ms    Calculated P Axis 63 degrees    Calculated R Axis 29 degrees    Calculated T Axis 79 degrees    Diagnosis       !! AGE AND GENDER SPECIFIC ECG ANALYSIS !!   Normal sinus rhythm  Left ventricular hypertrophy with repolarization abnormality  Septal infarct , age undetermined  Prolonged QT  Abnormal ECG  No previous ECGs available  Confirmed by Reji Mccord MD (), HERNANDEZ VARGAS ([id number]) on 2/23/2020 9:46:17 AM

## 2020-02-23 NOTE — ANESTHESIA PREPROCEDURE EVALUATION
Relevant Problems   No relevant active problems       Anesthetic History               Review of Systems / Medical History      Pulmonary                   Neuro/Psych              Cardiovascular    Hypertension: poorly controlled  Valvular problems/murmurs: aortic stenosis            Exercise tolerance: <4 METS  Comments: Aortic stenosis ?  severity   GI/Hepatic/Renal                Endo/Other             Other Findings              Physical Exam    Airway  Mallampati: I  TM Distance: > 6 cm  Neck ROM: normal range of motion   Mouth opening: Normal     Cardiovascular    Rhythm: regular  Rate: abnormal         Dental    Dentition: Full lower dentures and Full upper dentures     Pulmonary    Rhonchi:bibasilar             Abdominal         Other Findings            Anesthetic Plan    ASA: 3  Anesthesia type: general            Anesthetic plan and risks discussed with: Patient      SUSY

## 2020-02-23 NOTE — H&P
HOSPITALIST H&P/CONSULT  NAME:  Susannah Reasons   Age:  80 y.o.  :   10/18/1930   MRN:   [id number]  PCP: Harish Connell NP  Consulting MD:  Treatment Team: Attending Provider: Gonzalez Newman MD; Primary Nurse: Treva Goyal  HPI:     CC: R hip fracture       This is an 49-year-old female patient with a past medical history of dementia, aortic stenosis, resident of a nursing home who had a mechanical fall on 2020. After the fall it was noticed that the patient was not able to move the right lower extremity and it was internally rotated and shortened. An x-ray was done today and reported a right hip fracture. The patient was sent into the emergency room to be evaluated here. She was found to have a blood pressure of 181/98, heart rate of 97, respiratory rate of 18, O2 saturation of 96% at room air. The patient is hard of hearing. Her lungs were clear to auscultation. He had a regular rate and rhythm. Her abdomen was soft and nontender. Her right lower extremity was internally rotated and shortened. Her initial blood work-up reported normal white blood cell count, stable hemoglobin, stable electrolytes, creatinine of 0.5, bilirubin of 1.4, normal liver enzymes, chest x-ray showed no evidence of acute intrathoracic abnormality. A right hip x-ray showed right proximal femur basicervical femoral neck fracture. The patient was presented to the hospitalist team for admission    Case was discussed with ER MD.  10 point ROS done and is negative except as noted in HPI. Past Medical History:   Diagnosis Date    Aortic stenosis     Aortic valve stenosis 2016      History reviewed. No pertinent surgical history. Prior to Admission Medications   Prescriptions Last Dose Informant Patient Reported? Taking?   donepezil (ARICEPT) 10 mg tablet   Yes No   Sig: Take 10 mg by mouth nightly. memantine ER (NAMENDA XR) 28 mg capsule   Yes No   Sig: Take  by mouth daily.    risedronate (ACTONEL) 150 mg tablet   Yes No   Sig: Take 150 mg by mouth every thirty (30) days. Facility-Administered Medications: None     Home meds reconciled. No Known Allergies   Social History     Tobacco Use    Smoking status: Never Smoker    Smokeless tobacco: Never Used   Substance Use Topics    Alcohol use: No     Alcohol/week: 0.0 standard drinks      History reviewed. No pertinent family history. There is no immunization history on file for this patient. Objective:     Visit Vitals  /85   Pulse 94   Temp 98.4 °F (36.9 °C)   Resp 20   Ht 4' 11\" (1.499 m)   Wt 55.3 kg (122 lb)   SpO2 97%   BMI 24.64 kg/m²      Temp (24hrs), Av.4 °F (36.9 °C), Min:98.4 °F (36.9 °C), Max:98.4 °F (36.9 °C)    Oxygen Therapy  O2 Sat (%): 97 % (20 1105)  Pulse via Oximetry: 94 beats per minute (20 1104)  O2 Device: Nasal cannula (20 1104)  O2 Flow Rate (L/min): 2 l/min (20 1104)  Physical Exam:  General:    Alert, mild distress  Head:   NCAT. No obvious deformity  Nose:  Nares normal. No drainage  Lungs:   CTABL. No wheezing/rhonchi/rales  Heart:   RRR. No m/r/g. Abdomen:   S/nt/nd. Bowel sounds normal.   Extremities: Right lower extremity internally rotated and shortened. Skin:     No rashes or lesions. Not Jaundiced  Neurologic: Moves all extremities.   no gross focal deficits      Data Review:   Recent Results (from the past 24 hour(s))   CBC WITH AUTOMATED DIFF    Collection Time: 20  8:09 AM   Result Value Ref Range    WBC 10.0 4.3 - 11.1 K/uL    RBC 4.41 4.05 - 5.2 M/uL    HGB 14.6 11.7 - 15.4 g/dL    HCT 43.4 35.8 - 46.3 %    MCV 98.4 (H) 79.6 - 97.8 FL    MCH 33.1 (H) 26.1 - 32.9 PG    MCHC 33.6 31.4 - 35.0 g/dL    RDW 13.8 11.9 - 14.6 %    PLATELET 408 289 - 642 K/uL    MPV 11.5 9.4 - 12.3 FL    ABSOLUTE NRBC 0.00 0.0 - 0.2 K/uL    DF AUTOMATED      NEUTROPHILS 83 (H) 43 - 78 %    LYMPHOCYTES 11 (L) 13 - 44 %    MONOCYTES 5 4.0 - 12.0 %    EOSINOPHILS 1 0.5 - 7.8 %    BASOPHILS 0 0.0 - 2.0 %    IMMATURE GRANULOCYTES 0 0.0 - 5.0 %    ABS. NEUTROPHILS 8.2 1.7 - 8.2 K/UL    ABS. LYMPHOCYTES 1.1 0.5 - 4.6 K/UL    ABS. MONOCYTES 0.5 0.1 - 1.3 K/UL    ABS. EOSINOPHILS 0.1 0.0 - 0.8 K/UL    ABS. BASOPHILS 0.0 0.0 - 0.2 K/UL    ABS. IMM. GRANS. 0.0 0.0 - 0.5 K/UL   METABOLIC PANEL, COMPREHENSIVE    Collection Time: 02/23/20  8:09 AM   Result Value Ref Range    Sodium 139 136 - 145 mmol/L    Potassium 4.5 3.5 - 5.1 mmol/L    Chloride 108 (H) 98 - 107 mmol/L    CO2 25 21 - 32 mmol/L    Anion gap 6 (L) 7 - 16 mmol/L    Glucose 106 (H) 65 - 100 mg/dL    BUN 7 (L) 8 - 23 MG/DL    Creatinine 0.51 (L) 0.6 - 1.0 MG/DL    GFR est AA >60 >60 ml/min/1.73m2    GFR est non-AA >60 >60 ml/min/1.73m2    Calcium 8.9 8.3 - 10.4 MG/DL    Bilirubin, total 1.4 (H) 0.2 - 1.1 MG/DL    ALT (SGPT) 18 12 - 65 U/L    AST (SGOT) 37 15 - 37 U/L    Alk. phosphatase 54 50 - 136 U/L    Protein, total 7.0 6.3 - 8.2 g/dL    Albumin 3.2 3.2 - 4.6 g/dL    Globulin 3.8 (H) 2.3 - 3.5 g/dL    A-G Ratio 0.8 (L) 1.2 - 3.5     PROTHROMBIN TIME + INR    Collection Time: 02/23/20  8:09 AM   Result Value Ref Range    Prothrombin time 14.2 12.0 - 14.7 sec    INR 1.1     TYPE & SCREEN    Collection Time: 02/23/20  8:09 AM   Result Value Ref Range    Crossmatch Expiration 02/26/2020     ABO/Rh(D) A POSITIVE     Antibody screen NEG    EKG, 12 LEAD, INITIAL    Collection Time: 02/23/20  8:10 AM   Result Value Ref Range    Ventricular Rate 91 BPM    Atrial Rate 91 BPM    P-R Interval 196 ms    QRS Duration 84 ms    Q-T Interval 424 ms    QTC Calculation (Bezet) 521 ms    Calculated P Axis 63 degrees    Calculated R Axis 29 degrees    Calculated T Axis 79 degrees    Diagnosis       !! AGE AND GENDER SPECIFIC ECG ANALYSIS !!   Normal sinus rhythm  Left ventricular hypertrophy with repolarization abnormality  Septal infarct , age undetermined  Prolonged QT  Abnormal ECG  No previous ECGs available  Confirmed by Tova Walters MD (), HERNANDEZ VARGAS ([id number]) on 2/23/2020 9:46:17 AM       Imaging Jamison Chavez /Studies:  I personally reviewed all labs, imaging, and other studies this admission:  CXR reviewed by me. EKG reviewed by me. CXR Results  (Last 48 hours)               02/23/20 0919  XR CHEST SNGL V Final result    Impression:  IMPRESSION:   1. Right proximal femur basicervical femoral neck fracture. 2. No acute cardiopulmonary abnormality. Cardiomegaly suspected. Narrative:  Right hip, right femur, and chest x-ray       Clinical location right hip pain after a fall       FINDINGS:       Right hip: A bases cervical right proximal femoral neck fracture is appreciated   with superior migration of the diaphyseal fragment and varus angulation. Right femur: Four views of the right femur again show the right proximal femoral   neck fracture. No mid or distal femur fracture evident. The soft tissues are   unremarkable. Portable chest x-ray: The cardiac silhouette is prominent. The lungs are   expanded and clear. No pleural effusion or pneumothorax. The bones are   osteopenic. CT Results  (Last 48 hours)    None          Assessment and Plan: Active Hospital Problems    Diagnosis Date Noted    Hip fracture ([facility] 02/23/2020     Patient Active Problem List   Diagnosis Code    Aortic valve stenosis I35.0    Hip fracture ([facility] S72.009A    Dementia ([facility] F03.90       PLAN    #Acute right femoral neck fracture. The patient will be left n.p.o. Orthopedic surgery consulted. IV fluids ordered. Pain medication as needed. #History of aortic stenosis. No evidence of pulmonary edema or cardiac decompensation at the present time. Will need to be careful with fluid replacement to avoid pulmonary edema. #Hypertension. Possibly reactive. Monitor. #History of dementia. Continue Namenda 10 mg twice daily.   IV Haldol as needed        FEN: N.p.o.  DVT ppx: Compression devices  Code status: DNR  Estimated LOS: More than 2 midnights  Risk assessment: High  Plan of care discussed with: Patient's daughter      Preoperative evaluation    #1 is this an emergent surgery: No but can be considered urgent  #2 active cardiac conditions: No  #3  Cardiac risk index: 1 ( 6% probability of death in the next 30 days)   #4 functional capacity less than 4 METS  #5 surgical risk for orthopedic surgery: Moderate    EKG: Normal sinus rhythm possible LVH    Chest x-ray: No evidence of pulmonary edema or any other abnormality    This is a moderate risk patient for moderate risk procedure, however considering her age, frailty and history of aortic stenosis she could be considered at high risk.   No absolute contraindication for surgery at this time.    -Avoid aggressive fluid resuscitation  -Recommend remote telemetry monitoring after the surgery  -Monitor mental status in the next 48 hours    Signed By: Rachel Aldana MD     February 23, 2020

## 2020-02-23 NOTE — ED TRIAGE NOTES
Pt arrives ems from [facility] at [facility]. Hx of memory care unit. Fall yesterday, xray performed and confirmed proximal right femur fracture and proximal right hip. Positive for shortening and rotation. /71, HR 93, 94% RA. . Axillary temp 99.5.

## 2020-02-23 NOTE — PROGRESS NOTES
02/23/20 1136   Dual Skin Pressure Injury Assessment   Dual Skin Pressure Injury Assessment X   Second Care Provider (Based on [address] [place] [address]) ESTELA Story   Buttocks/Ishium  Left   Skin Integumentary   Skin Integumentary (WDL) X   Skin Color Appropriate for ethnicity   Skin Condition/Temp Warm;Dry   Skin Integrity Intact; Abrasion  (RUE healed abrasions)   Turgor Non-tenting   Hair Growth Present   Varicosities Absent   Wound Prevention and Protection Methods   Orientation of Wound Prevention Posterior   Location of Wound Prevention Sacrum/Coccyx   Dressing Present  Yes; Intact, not due to be changed  (Placement)   Dressing Status Intact   Wound Offloading (Prevention Methods) Bed, pressure reduction mattress;Pillows;Repositioning   Sacrum red but blanchable, healed abrasion on left buttock, and heels intact.

## 2020-02-23 NOTE — BRIEF OP NOTE
BRIEF OPERATIVE NOTE    Date of Procedure: 2/23/2020   Preoperative Diagnosis: right hip fracture  Postoperative Diagnosis: right hip fracture    Procedure(s):  RIGHT HIP HEMIARTHROPLASTY  Surgeon(s) and Role:     Bar Levine DO - Iron         Surgical Assistant:     Surgical Staff:  Circ-1: Sheila Mary RN  Scrub Tech-1: Anum [facility]  Scrub Tech-2: Vadim Thomson  Event Time In Time Out   Incision Start 1357    Incision Close       Anesthesia: General   Estimated Blood Loss: 100  Specimens:  Femoral head  Findings: fracture femur  Complications:   Implants:   Implant Name Type Inv.  Item Serial No.  Lot No. LRB No. Used Action   femoral stem 12/14 taper actis duofix hip prosthesis cementless size 7 std collar     [id number] Right 1 Implanted   HEAD FEM [facility] [id number] BRN --  - [id number]  HEAD FEM [facility] 48X28 BRN --   [facility] [id number] Right 1 Implanted   HEAD FEM 28MM +5MM NK --  - [id number]  HEAD FEM 28MM +5MM NK --   [facility] [employer] [id number] Right 1 Implanted

## 2020-02-23 NOTE — ANESTHESIA POSTPROCEDURE EVALUATION
Procedure(s):  RIGHT HIP HEMIARTHROPLASTY. general    Anesthesia Post Evaluation      Multimodal analgesia: multimodal analgesia not used between 6 hours prior to anesthesia start to PACU discharge  Patient location during evaluation: PACU  Patient participation: complete - patient participated  Level of consciousness: awake and alert  Pain management: adequate  Airway patency: patent  Anesthetic complications: no  Cardiovascular status: hemodynamically stable  Respiratory status: acceptable  Hydration status: acceptable        Vitals Value Taken Time   /79 2/23/2020  3:29 PM   Temp 37.7 °C (99.9 °F) 2/23/2020  3:19 PM   Pulse 88 2/23/2020  3:38 PM   Resp 16 2/23/2020  3:19 PM   SpO2 96 % 2/23/2020  3:38 PM   Vitals shown include unvalidated device data.

## 2020-02-23 NOTE — CONSULTS
Physiatry Consult Received; Under the Osteoporotic Hip Fx Protocol    89[facility] proximal femur basicervical femoral neck fracture. . Per HPI she is a resident of a NH with hx of dementia. Recommend; surgical intervention and return to NH. Not a candidate for [facility] . Depending on premorbid functional level, she may qualifty for short term rehab in a Skilled nursing facility prior to dc to NH.   Overall rehab potential is quite guarded considering advanced age, comorbidities and dementia    NO charge to pt    [address]

## 2020-02-23 NOTE — PERIOP NOTES
TRANSFER - OUT REPORT:    Verbal report given to Josselyn on Robles Argueta  being transferred to  for routine post - op       Report consisted of patients Situation, Background, Assessment and   Recommendations(SBAR). Information from the following report(s) OR Summary, Procedure Summary, Intake/Output and MAR was reviewed with the receiving nurse. Lines:   Peripheral IV 02/23/20 Left Hand (Active)   Site Assessment Clean, dry, & intact 2/23/2020  2:49 PM   Phlebitis Assessment 0 2/23/2020  2:49 PM   Infiltration Assessment 0 2/23/2020  2:49 PM   Dressing Status Clean, dry, & intact 2/23/2020  2:49 PM   Dressing Type Transparent;Tape 2/23/2020  2:49 PM   Hub Color/Line Status Infusing;Pink 2/23/2020  2:49 PM   Alcohol Cap Used No 2/23/2020  2:49 PM        Opportunity for questions and clarification was provided. Patient transported with:   O2 @ 2 liters     Receiving nurse states that pt is on remote tele and can travel without a nurse transport. VTE prophylaxis orders have been written for Robles Argueta. Patient and family given floor number and nurses name. Family updated re: pt status after security code verified.

## 2020-02-23 NOTE — PROGRESS NOTES
CM met with pt and pt's family at bedside to complete assessment. Pt's daughter Gokul Torres assisted with assessment, due to the pt's mental status. The pt's daughter  verified address, emergency contact, insurance, and PCP. The pt is a resident of [address] at [facility] in the memory care unit. The pt is not independent with completing ADL's. Staff at [facility], assists with bathing, dressing, and cleaning. The pt receives 3 meals a day. The pt's family assists with laundry. Pt's daughter confirmed DME, such as a standard walker and shower chair. Staff at [facility] [employer] the pt's medications and provides transportation when needed. PT/OT have been consulted. PPD has been placed. Pt's daughter voiced concerns regarding the pt's discharge plan. Pt's daughter states the patient has a contract with [facility] for only [address] and may potentially need Skilled Care ongoing. Pt's daughter is unsure if the pt will be able to return to facility, due to the pt not being able to financially afford skilled care at [address]. CM informed pt's daughter, she would need to speak with staff at [facility], to determine if the pt can return and if the patient is able to convert to skilled care at affordable pricing, if recommended. CM provided a SNF list for the pt's family to review for STR. The pt's daughter feels skilled care may potentially be best for the pt, due to requiring skilled care at this time. Pt's daughter informed CM, she will begin looking at [facility] facilities, to identify pricing for facilities, if the patient is not able to return to [facility], due to supportive care needs. CM was receptive to information. CM will follow up with pt's family, to identify if the patient can return with skilled care, if recommended. No additional needs voiced at this time. CM continues to follow pt.   Care Management Interventions  PCP Verified by CM: Yes(NP/MD inhouse of [facility]. )  Mode of Transport at Discharge: Other (see comment)  Transition of Care Consult (CM Consult): Discharge Planning, Other  Discharge Durable Medical Equipment: No(Pt's daughter confirmed DME, such as standard walker, and showerchair. )  Physical Therapy Consult: Yes  Occupational Therapy Consult: Yes  Speech Therapy Consult: No  Current Support Network: Other(The [facility].))  Confirm Follow Up Transport: Other (see comment)([place])  The Plan for Transition of Care is Related to the Following Treatment Goals : Pt to obtain care to become medically stable for discharge and to assist with STR to improve physical mobility.    The Patient and/or Patient Representative was Provided with a Choice of Provider and Agrees with the Discharge Plan?: Yes  Name of the Patient Representative Who was Provided with a Choice of Provider and Agrees with the Discharge Plan: Pt's daughter assisted with Ingrid Marte of Choice List was Provided with Basic Dialogue that Supports the Patient's Individualized Plan of Care/Goals, Treatment Preferences and Shares the Quality Data Associated with the Providers?: Yes   Resource Information Provided?: No  Discharge Location  Discharge Placement: Unable to determine at this time

## 2020-02-24 ENCOUNTER — APPOINTMENT (OUTPATIENT)
Dept: GENERAL RADIOLOGY | Age: 85
DRG: 470 | End: 2020-02-24
Attending: ORTHOPAEDIC SURGERY
Payer: MEDICARE

## 2020-02-24 ENCOUNTER — APPOINTMENT (OUTPATIENT)
Dept: GENERAL RADIOLOGY | Age: 85
DRG: 470 | End: 2020-02-24
Attending: INTERNAL MEDICINE
Payer: MEDICARE

## 2020-02-24 LAB
ATRIAL RATE: 104 BPM
BACTERIA SPEC CULT: NORMAL
BASOPHILS # BLD: 0 K/UL (ref 0–0.2)
BASOPHILS NFR BLD: 0 % (ref 0–2)
CALCULATED P AXIS, ECG09: 5 DEGREES
CALCULATED R AXIS, ECG10: -67 DEGREES
CALCULATED T AXIS, ECG11: 91 DEGREES
DIAGNOSIS, 93000: NORMAL
DIFFERENTIAL METHOD BLD: ABNORMAL
EOSINOPHIL # BLD: 0.1 K/UL (ref 0–0.8)
EOSINOPHIL NFR BLD: 1 % (ref 0.5–7.8)
ERYTHROCYTE [DISTWIDTH] IN BLOOD BY AUTOMATED COUNT: 13.7 % (ref 11.9–14.6)
HCT VFR BLD AUTO: 37.5 % (ref 35.8–46.3)
HGB BLD-MCNC: 12.2 G/DL (ref 11.7–15.4)
IMM GRANULOCYTES # BLD AUTO: 0 K/UL (ref 0–0.5)
IMM GRANULOCYTES NFR BLD AUTO: 0 % (ref 0–5)
LYMPHOCYTES # BLD: 1.1 K/UL (ref 0.5–4.6)
LYMPHOCYTES NFR BLD: 12 % (ref 13–44)
MCH RBC QN AUTO: 32.6 PG (ref 26.1–32.9)
MCHC RBC AUTO-ENTMCNC: 32.5 G/DL (ref 31.4–35)
MCV RBC AUTO: 100.3 FL (ref 79.6–97.8)
MM INDURATION POC: 0 MM (ref 0–5)
MONOCYTES # BLD: 0.6 K/UL (ref 0.1–1.3)
MONOCYTES NFR BLD: 7 % (ref 4–12)
NEUTS SEG # BLD: 7.4 K/UL (ref 1.7–8.2)
NEUTS SEG NFR BLD: 80 % (ref 43–78)
NRBC # BLD: 0 K/UL (ref 0–0.2)
P-R INTERVAL, ECG05: 148 MS
PLATELET # BLD AUTO: 194 K/UL (ref 150–450)
PMV BLD AUTO: 10.9 FL (ref 9.4–12.3)
PPD POC: NEGATIVE NEGATIVE
Q-T INTERVAL, ECG07: 404 MS
QRS DURATION, ECG06: 82 MS
QTC CALCULATION (BEZET), ECG08: 531 MS
RBC # BLD AUTO: 3.74 M/UL (ref 4.05–5.2)
SERVICE CMNT-IMP: NORMAL
VENTRICULAR RATE, ECG03: 104 BPM
WBC # BLD AUTO: 9.4 K/UL (ref 4.3–11.1)

## 2020-02-24 PROCEDURE — 74011000250 HC RX REV CODE- 250: Performed by: INTERNAL MEDICINE

## 2020-02-24 PROCEDURE — 97535 SELF CARE MNGMENT TRAINING: CPT

## 2020-02-24 PROCEDURE — 74011000258 HC RX REV CODE- 258: Performed by: ORTHOPAEDIC SURGERY

## 2020-02-24 PROCEDURE — 74011250637 HC RX REV CODE- 250/637: Performed by: INTERNAL MEDICINE

## 2020-02-24 PROCEDURE — 74011250636 HC RX REV CODE- 250/636: Performed by: INTERNAL MEDICINE

## 2020-02-24 PROCEDURE — 36415 COLL VENOUS BLD VENIPUNCTURE: CPT

## 2020-02-24 PROCEDURE — 93005 ELECTROCARDIOGRAM TRACING: CPT | Performed by: INTERNAL MEDICINE

## 2020-02-24 PROCEDURE — 97112 NEUROMUSCULAR REEDUCATION: CPT

## 2020-02-24 PROCEDURE — 65660000000 HC RM CCU STEPDOWN

## 2020-02-24 PROCEDURE — 77030038269 HC DRN EXT URIN PURWCK BARD -A

## 2020-02-24 PROCEDURE — 97162 PT EVAL MOD COMPLEX 30 MIN: CPT

## 2020-02-24 PROCEDURE — 74011250636 HC RX REV CODE- 250/636: Performed by: ORTHOPAEDIC SURGERY

## 2020-02-24 PROCEDURE — 74011250637 HC RX REV CODE- 250/637: Performed by: ORTHOPAEDIC SURGERY

## 2020-02-24 PROCEDURE — 73502 X-RAY EXAM HIP UNI 2-3 VIEWS: CPT

## 2020-02-24 PROCEDURE — 71045 X-RAY EXAM CHEST 1 VIEW: CPT

## 2020-02-24 PROCEDURE — 85025 COMPLETE CBC W/AUTO DIFF WBC: CPT

## 2020-02-24 PROCEDURE — 97166 OT EVAL MOD COMPLEX 45 MIN: CPT

## 2020-02-24 RX ORDER — SODIUM CHLORIDE 9 MG/ML
50 INJECTION, SOLUTION INTRAVENOUS CONTINUOUS
Status: DISCONTINUED | OUTPATIENT
Start: 2020-02-24 | End: 2020-02-24

## 2020-02-24 RX ORDER — METOPROLOL TARTRATE 5 MG/5ML
2.5 INJECTION INTRAVENOUS
Status: DISCONTINUED | OUTPATIENT
Start: 2020-02-24 | End: 2020-02-26 | Stop reason: HOSPADM

## 2020-02-24 RX ORDER — METOPROLOL TARTRATE 5 MG/5ML
2.5 INJECTION INTRAVENOUS
Status: COMPLETED | OUTPATIENT
Start: 2020-02-24 | End: 2020-02-24

## 2020-02-24 RX ORDER — SODIUM CHLORIDE 9 MG/ML
50 INJECTION, SOLUTION INTRAVENOUS CONTINUOUS
Status: DISPENSED | OUTPATIENT
Start: 2020-02-24 | End: 2020-02-25

## 2020-02-24 RX ORDER — METOPROLOL TARTRATE 25 MG/1
12.5 TABLET, FILM COATED ORAL EVERY 12 HOURS
Status: DISCONTINUED | OUTPATIENT
Start: 2020-02-24 | End: 2020-02-26 | Stop reason: HOSPADM

## 2020-02-24 RX ADMIN — METOPROLOL TARTRATE 2.5 MG: 5 INJECTION INTRAVENOUS at 19:22

## 2020-02-24 RX ADMIN — Medication 5 ML: at 14:00

## 2020-02-24 RX ADMIN — ACETAMINOPHEN 650 MG: 325 TABLET, FILM COATED ORAL at 18:10

## 2020-02-24 RX ADMIN — Medication 1 TABLET: at 18:10

## 2020-02-24 RX ADMIN — Medication 5 ML: at 23:56

## 2020-02-24 RX ADMIN — SODIUM CHLORIDE 50 ML/HR: 900 INJECTION, SOLUTION INTRAVENOUS at 23:55

## 2020-02-24 RX ADMIN — Medication 5 ML: at 05:45

## 2020-02-24 RX ADMIN — MEMANTINE 10 MG: 5 TABLET ORAL at 18:10

## 2020-02-24 RX ADMIN — ACETAMINOPHEN 650 MG: 325 TABLET, FILM COATED ORAL at 02:49

## 2020-02-24 RX ADMIN — FAMOTIDINE 20 MG: 10 INJECTION INTRAVENOUS at 11:34

## 2020-02-24 RX ADMIN — MEMANTINE 10 MG: 5 TABLET ORAL at 09:43

## 2020-02-24 RX ADMIN — Medication 1 TABLET: at 09:44

## 2020-02-24 RX ADMIN — METOPROLOL TARTRATE 12.5 MG: 25 TABLET, FILM COATED ORAL at 20:05

## 2020-02-24 RX ADMIN — Medication 1 TABLET: at 11:34

## 2020-02-24 RX ADMIN — SODIUM CHLORIDE 1000 MG: 900 INJECTION, SOLUTION INTRAVENOUS at 05:45

## 2020-02-24 RX ADMIN — Medication 5 ML: at 13:44

## 2020-02-24 RX ADMIN — ENOXAPARIN SODIUM 30 MG: 30 INJECTION SUBCUTANEOUS at 09:44

## 2020-02-24 RX ADMIN — ACETAMINOPHEN 650 MG: 325 TABLET, FILM COATED ORAL at 11:34

## 2020-02-24 NOTE — PROGRESS NOTES
Problem: Falls - Risk of  Goal: *Absence of Falls  Description  Document Melissa Manzanares Fall Risk and appropriate interventions in the flowsheet. Outcome: Progressing Towards Goal  Note: Fall Risk Interventions:  Mobility Interventions: Bed/chair exit alarm, OT consult for ADLs, Patient to call before getting OOB, PT Consult for mobility concerns    Mentation Interventions: Bed/chair exit alarm, Adequate sleep, hydration, pain control, Door open when patient unattended, Eyeglasses and hearing aids, Reorient patient, More frequent rounding         Elimination Interventions: Bed/chair exit alarm, Call light in reach, Patient to call for help with toileting needs, Stay With Me (per policy), Toileting schedule/hourly rounds    History of Falls Interventions: Bed/chair exit alarm, Door open when patient unattended, Investigate reason for fall         Problem: Patient Education: Go to Patient Education Activity  Goal: Patient/Family Education  Outcome: Progressing Towards Goal     Problem: Pressure Injury - Risk of  Goal: *Prevention of pressure injury  Description  Document Cyrus Scale and appropriate interventions in the flowsheet.   Outcome: Progressing Towards Goal  Note: Pressure Injury Interventions:  Sensory Interventions: Assess changes in LOC, Check visual cues for pain, Discuss PT/OT consult with provider, Keep linens dry and wrinkle-free, Pressure redistribution bed/mattress (bed type), Pad between skin to skin    Moisture Interventions: Absorbent underpads, Apply protective barrier, creams and emollients, Check for incontinence Q2 hours and as needed, Limit adult briefs, Internal/External urinary devices    Activity Interventions: Increase time out of bed, Pressure redistribution bed/mattress(bed type), PT/OT evaluation    Mobility Interventions: HOB 30 degrees or less, Pressure redistribution bed/mattress (bed type), PT/OT evaluation    Nutrition Interventions: Document food/fluid/supplement intake    Friction and Shear Interventions: Apply protective barrier, creams and emollients, Foam dressings/transparent film/skin sealants                Problem: Patient Education: Go to Patient Education Activity  Goal: Patient/Family Education  Outcome: Progressing Towards Goal     Problem: Hip Fracture: Day of Surgery Post-op Care  Goal: Activity/Safety  Outcome: Progressing Towards Goal  Goal: Consults, if ordered  Outcome: Progressing Towards Goal  Goal: Diagnostic Test/Procedures  Outcome: Progressing Towards Goal  Goal: Nutrition/Diet  Outcome: Progressing Towards Goal  Goal: Medications  Outcome: Progressing Towards Goal  Goal: Respiratory  Outcome: Progressing Towards Goal  Goal: Treatments/Interventions/Procedures  Outcome: Progressing Towards Goal  Goal: Psychosocial  Outcome: Progressing Towards Goal  Goal: *Absence of skin breakdown  Outcome: Progressing Towards Goal  Goal: *Optimal pain control at patient's stated goal  Outcome: Progressing Towards Goal  Goal: *Hemodynamically stable  Outcome: Progressing Towards Goal

## 2020-02-24 NOTE — PROGRESS NOTES
Problem: Mobility Impaired (Adult and Pediatric)  Goal: *Acute Goals and Plan of Care (Insert Text)  Description  ST. Patient will perform bed mobility with MODERATE ASSISTANCE within 3 days. 2. Patient will transfer bed to chair with MODERATE ASSISTANCE x2 within 3 days. 3. Patient will demonstrate GOOD DYNAMIC SITTING balance within 3 day(s). 4. Patient will tolerate 15+ minutes of therapeutic activity/exercise and/or neuromuscular re-education while maintaining stable vitals to improve functional strength and activity tolerance within 3 days. LT. Patient will perform bed mobility with MINIMAL ASSISTANCE within 7 days. 2. Patient will transfer bed to chair with MODERATE ASSISTANCE x1 within 7 days. 3. Patient will demonstrate FAIR DYNAMIC STANDING balance within 7 day(s). 4. Patient will ambulate 10ft+ using least restrictive assistive device to assist with transfers with MODERATE ASSISTANCE x1 within 7 days. 5. Patient will tolerate 25+ minutes of therapeutic activity/exercise and/or neuromuscular re-education while maintaining stable vitals to improve functional strength and activity tolerance within 7 days. Will advance goals as patient is able. Outcome: Progressing Towards Goal       PHYSICAL THERAPY: Initial Assessment, Daily Note, and AM 2020  INPATIENT: PT Visit Days : 1  Payor: Franklin Quach / Plan: [address] PPO / Product Type: PPO /      WBAT R LE; Hip Precautions  KI in bed/chair     NAME/AGE/GENDER: Abby Peterson is a 80 y.o. female   PRIMARY DIAGNOSIS: [facility] [S72.009A] <principal problem not specified> <principal problem not specified>  Procedure(s) (LRB):  RIGHT HIP HEMIARTHROPLASTY (Right)  1 Day Post-Op  ICD-10: Treatment Diagnosis:    Difficulty in walking, Not elsewhere classified (R26.2)  History of falling (Z91.81)   Precaution/Allergies:  Patient has no known allergies.       ASSESSMENT:     Ms. Shante Ziegler is a 80year old female admitted from [facility] s/p fall with resultant right femoral neck fracture. Patient is now 1 day s/p R hip hemiarthroplasty and is WBAT R LE with hip precautions and KI in bed/chair. Patient seen this AM for initial physical therapy evaluation: presents supine in bed, oriented to first name only with cueing, disoriented x4 otherwise, and FLACC 0 at rest and 5 with mobility. Per chart review, patient is a resident of a memory care unit, requires assistance with ADLs, and unsure of ambulatory status. Today active distal motor appreciated B LE with sensation intact to noxious stimuli. Patient has cervical rotation preference to left and left lateral lean in sitting (unsure of baseline, yet left lateral lean in sitting is not uncommon in right hip post op to off load weight.) Patient does orient to right visual field with corrective lenses and moderate to maximal cueing. Removed KI for mobility. Patient required maximal assistance for bed mobility and total assistance for SPT from bed to chair with gait belt. Addressed postural retraining and static and dynamic sitting balance at edge of bed as well as positioning to provide pressure relief and midline orientation. Unable to educated on hip precautions secondary to patient cognition. Ms. Shante Ziegler is tolerating mobility fair post operatively; does present with decreased functional mobility and functional activity tolerance from baseline. Recommend continued skilled PT services to address stated deficits. Will follow and progress toward stated goals during acute stay.       This section established at most recent assessment   PROBLEM LIST (Impairments causing functional limitations):  Decreased Strength  Decreased ADL/Functional Activities  Decreased Transfer Abilities  Decreased Ambulation Ability/Technique  Decreased Balance  Increased Pain  Decreased Activity Tolerance  Decreased Flexibility/Joint Mobility  Decreased Knowledge of Precautions  Decreased Cognition   INTERVENTIONS PLANNED: (Benefits and precautions of physical therapy have been discussed with the patient.)  Balance Exercise  Bed Mobility  Family Education  Gait Training  Group Therapy  Home Exercise Program (HEP)  Neuromuscular Re-education/Strengthening  Range of Motion (ROM)  Therapeutic Activites  Therapeutic Exercise/Strengthening  Transfer Training     TREATMENT PLAN: Frequency/Duration: BID for duration of hospital stay  Rehabilitation Potential For Stated Goals: [facility] (at time of discharge pending progress):    Placement: It is my opinion, based on this patient's performance to date, that Ms. Rain Salmeron may benefit from intensive therapy at a [address] [place] [address] after discharge due to the functional deficits listed above that are likely to improve with skilled rehabilitation and concerns that he/she may be unsafe to be unsupervised at home due to decreased functional mobility and functional activity tolerance. Equipment:   TBD pending progress               HISTORY:   History of Present Injury/Illness (Reason for Referral):  S/po right hip hemiarthroplasty  Past Medical History/Comorbidities:   Ms. Rain Salmeron  has a past medical history of Aortic stenosis and Aortic valve stenosis (5/12/2016). Ms. Rain Salmeron  has no past surgical history on file. Social History/Living Environment:   Home Environment: Skilled nursing facility(Memory Care Unit)  [address] [place] [address] Name: [facility]: One story  Living Alone: No  Support Systems: [facility]  Patient Expects to be Discharged to[de-identified] [facility]  Current DME Used/Available at Home: Perry Rivas, [facility] chair  Prior Level of Function/Work/Activity:  Per chart review, patient is a resident of a memory care unit, requires assistance with ADLs, and unsure of ambulatory status.    Dominant Side:         RIGHT   Number of Personal Factors/Comorbidities that affect the Plan of Care: 0: LOW COMPLEXITY   EXAMINATION:   Most Recent Physical Functioning:   Gross Assessment:  AROM: Generally decreased, functional  Strength: Generally decreased, functional  Sensation: Intact(noxious B LE)               Posture:     Balance:  Sitting: Impaired  Sitting - Static: Fair (occasional)  Sitting - Dynamic: Fair (occasional)  Standing: Impaired  Standing - Static: Poor  Standing - Dynamic : Poor Bed Mobility:  Supine to Sit: Maximum assistance;Assist x2  Scooting: Total assistance  Wheelchair Mobility:     Transfers:  Sit to Stand: Total assistance;Assist x2  Stand to Sit: Total assistance;Assist x2  Stand Pivot Transfers: Total assistance;Assist x2  Bed to Chair: Total assistance;Assist x2  Interventions: Safety awareness training; Tactile cues; Verbal cues  Gait:            Body Structures Involved:  Bones  Joints  Muscles Body Functions Affected:  Neuromusculoskeletal  Movement Related Activities and Participation Affected: Mobility  Self Care   Number of elements that affect the Plan of Care: 4+: HIGH COMPLEXITY   CLINICAL PRESENTATION:   Presentation: Evolving clinical presentation with changing clinical characteristics: MODERATE COMPLEXITY   CLINICAL DECISION MAKIN [facility] Inpatient Short Form  How much difficulty does the patient currently have. .. Unable A Lot A Little None   1. Turning over in bed (including adjusting bedclothes, sheets and blankets)? [] 1   [x] 2   [] 3   [] 4   2. Sitting down on and standing up from a chair with arms ( e.g., wheelchair, bedside commode, etc.)   [x] 1   [] 2   [] 3   [] 4   3. Moving from lying on back to sitting on the side of the bed? [] 1   [x] 2   [] 3   [] 4   How much help from another person does the patient currently need. .. Total A Lot A Little None   4. Moving to and from a bed to a chair (including a wheelchair)? [x] 1   [] 2   [] 3   [] 4   5. Need to walk in hospital room?    [x] 1   [] 2 [] 3   [] 4   6. Climbing 3-5 steps with a railing? [x] 1   [] 2   [] 3   [] 4   © 2007, Trustees of [address] [place] [address] [address] [id number], under license to [employer]. All rights reserved      Score:  Initial: 8 Most Recent: 8 (Date: 2/24/2020 )    Interpretation of Tool:  Represents activities that are increasingly more difficult (i.e. Bed mobility, Transfers, Gait). Medical Necessity:     Patient demonstrates   fair   rehab potential due to higher previous functional level. Reason for Services/Other Comments:  Patient continues to require skilled intervention due to   Decreased functional mobility and balance/gait status from baseline   . Use of outcome tool(s) and clinical judgement create a POC that gives a: Clear prediction of patient's progress: LOW COMPLEXITY            TREATMENT:   (In addition to Assessment/Re-Assessment sessions the following treatments were rendered)   Pre-treatment Symptoms/Complaints:    Pain: Initial:   Pain Intensity 1: 5  Pain Location 1: Hip, Leg  Pain Orientation 1: Right  Pain Intervention(s) 1: Emotional support, Rest, Repositioned  Post Session:  FLACC 0 in chair at end of session     Initial Evaluation (untimed charge)  Neuromuscular Re-education: ( 24 Minutes):  Exercise/activities including bed mobility and transfers with facilitation and cueing, static and dynamic sitting and standing balance, cueing to oriented right side and for positioning in midline to improve balance, posture, and functional mobility . Required maximal to total assistance for mobility as well as  visual, verbal, and tactile cues to promote static and dynamic balance in sitting. Braces/Orthotics/Lines/Etc:   IV  Purwick    O2 Device: Nasal cannula  Treatment/Session Assessment:    Response to Treatment:  See above.   Interdisciplinary Collaboration:   Physical Therapist  Registered Nurse  After treatment position/precautions:   Up in chair  Bed alarm/tab alert on  Bed/Chair-wheels locked  Bed in low position  Call light within reach  RN notified  Mitts replaced MIMA Capps, RN notified of patient status/progress   Compliance with Program/Exercises: Will assess as treatment progresses  Recommendations/Intent for next treatment session: \"Next visit will focus on advancements to more challenging activities and reduction in assistance provided\".   Total Treatment Duration:  PT Patient Time In/Time Out  Time In: 0856  Time Out: Naa 113, DPT

## 2020-02-24 NOTE — PROGRESS NOTES
Problem: Self Care Deficits Care Plan (Adult)  Goal: *Acute Goals and Plan of Care (Insert Text)  Description  1. Patient will complete lower body bathing and dressing with Max A and adaptive equipment as needed. 2. Patient will complete toileting with Max A and adaptive equipment as needed. 3. Patient will tolerate 23 minutes of OT treatment with 2 rest breaks to increase activity tolerance for ADLs. 4. Patient will complete functional transfers with Mod A x 2 and adaptive equipment as needed. 5. Patient will feed self entire meal with Mod A and adaptive utensils as needed. 6. Patient will attend to R side for 50% of treatment session with moderate verbal cues from therapist.      Timeframe: 7 visits     Outcome: Progressing Towards Goal    OCCUPATIONAL THERAPY: Initial Assessment, Daily Note and AM 2/24/2020  INPATIENT: OT Visit Days: 1  Payor: Shala Score / Plan: [address] PPO / Product Type: PPO /      NAME/AGE/GENDER: Waleska Rachel is a 80 y.o. female   PRIMARY DIAGNOSIS:  Hip fracture ([facility] <principal problem not specified> <principal problem not specified>  Procedure(s) (LRB):  RIGHT HIP HEMIARTHROPLASTY (Right)  1 Day Post-Op  ICD-10: Treatment Diagnosis:    · Generalized Muscle Weakness (M62.81)  · Difficulty in walking, Not elsewhere classified (R26.2)  · Other abnormalities of gait and mobility (R26.89)   Precautions/Allergies:    WBAT R LE Patient has no known allergies. ASSESSMENT:     Ms. Jian Garcia is a 80 y.o. female admitted after fall on 2/22/2020 and found to have R hip fracture. Now s/p R Hip Hemiarthroplasty and WBAT in R LE. Pt with prior diagnosis of Dementia thus majority of information from medical chart review provided by daughter. At baseline, patient lives in [facility]. Pt receiving assist for ADLs and dependent on staff for IADLs. Family in area and assist pt with laundry.  Pt using walk[facility] and shower chair at [facility], but unclear as to how often or for in home or community distances. Pt supine in bed breathing room air with bilateral mittens donned. Pt sleeping but awakens to therapists voices. Pt attends to L side for majority of session, despite maximal verbal, visual, and tactile cueing from therapy. Presents with decreased memory and confusion and is only able to state first name when asked orientation questions. Nonverbal for majority of session. Completes bed mobility with Max A x 2. Seated edge of bed with fair static and dynamic balance with lean towards left side. Provided with maximal verbal, visual, and tactile cueing to orient to midline. BUE grossly decreased but pt does extend elbow forward to reach hand of therapist. Does have strong . Pt resistive to perform transfer to chair, presenting with posterior lean and grasping onto bed sheet. Total A to don bilateral socks. Pt requires Total A x 2 to perform stand pivot transfer from bed to chair. Poor static and dynamic seated balance. Seated upright in recliner chair, pt Max A for feeding to bring cup to mouth for drinking. Pt able to manage straw with mouth to drink. Pt left seated upright in recliner chair with all needs met and within reach. RN notifed. At this time, patient is appropriate for Co-treatment with physical therapy due to patient's decreased overall endurance/tolerance levels, as well as need for high level skilled assistance and cueing to complete functional transfers/mobility and functional tasks. Pt is appropriate for a multidisciplinary co-treatment of PT and OT to address goals of both disciplines. Coco Das is currently functioning below baseline for ADLs and functional mobility and would benefit from acute OT to increase independence and safety with ADLs. Will follow for duration of hospital stay to address the above goals. Patient was educated on role and plan of care of occupational therapy.     This section established at most recent assessment   PROBLEM LIST (Impairments causing functional limitations):  1. Decreased Strength  2. Decreased ADL/Functional Activities  3. Decreased Transfer Abilities  4. Decreased Ambulation Ability/Technique  5. Decreased Balance  6. Increased Pain  7. Decreased Activity Tolerance  8. Decreased Flexibility/Joint Mobility  9. Decreased Knowledge of Precautions  10. Decreased Cognition   INTERVENTIONS PLANNED: (Benefits and precautions of occupational therapy have been discussed with the patient.)  1. Activities of daily living training  2. Adaptive equipment training  3. Balance training  4. Clothing management  5. Neuromuscular re-eduation  6. Re-evaluation  7. Therapeutic activity  8. Therapeutic exercise     TREATMENT PLAN: Frequency/Duration: Follow patient 6x/week to address above goals. Rehabilitation Potential For Stated Goals: 52 [facility] (at time of discharge pending progress):    Placement: It is my opinion, based on this patient's performance to date, that Ms. Astrid Castro may benefit from intensive therapy at a [address] [place] [address] after discharge due to the functional deficits listed above that are likely to improve with skilled rehabilitation and concerns that he/she may be unsafe to be unsupervised at home due to decreased independence, safety, balance, strength, and activity tolerance for performance of ADLs and functional mobility. .  Equipment:    TBD              OCCUPATIONAL PROFILE AND HISTORY:   History of Present Injury/Illness (Reason for Referral):  See H & P  Past Medical History/Comorbidities:   Ms. Astrid Castro  has a past medical history of Aortic stenosis and Aortic valve stenosis (5/12/2016). Ms. Astrid Castro  has no past surgical history on file.   Social History/Living Environment:   Home Environment: Skilled nursing facility(Memory Care Unit)  [address] [place] [address] Name: [facility]: One story  Living Alone: No  Support Systems: Skilled nursing facility  Patient Expects to be Discharged to[de-identified] Skilled nursing facility  Current DME Used/Available at Home: Sharondaette Severe, [address] [facility] chair  Prior Level of Function/Work/Activity:  Pt with prior diagnosis of Dementia thus majority of information from medical chart review. At baseline, patient lives in [facility]. Pt receiving assist for ADLs and dependent on staff for IADLs. Family in area and assist pt with laundry. Pt using walker and shower chair at SNF, but unclear as to how often or for in home or community distances. Personal Factors:          Sex:  female        Age:  80 y.o. Number of Personal Factors/Comorbidities that affect the Plan of Care: Expanded review of therapy/medical records (1-2):  MODERATE COMPLEXITY   ASSESSMENT OF OCCUPATIONAL PERFORMANCE[de-identified]   Activities of Daily Living:   Basic ADLs (From Assessment) Complex ADLs (From Assessment)   Feeding: Maximum assistance  Oral Facial Hygiene/Grooming: Maximum assistance  Bathing: Total assistance  Upper Body Dressing: Total assistance  Lower Body Dressing: Total assistance  Toileting: Total assistance Instrumental ADL  Meal Preparation: Total assistance  Homemaking: Total assistance  Medication Management: Total assistance   Grooming/Bathing/Dressing Activities of Daily Living           Feeding  Feeding Assistance: Maximum assistance  Drink to Mouth: Maximum assistance               Lower Body Dressing Assistance  Socks: Total assistance (dependent) Bed/Mat Mobility  Supine to Sit: Maximum assistance;Assist x2  Sit to Stand: Total assistance;Assist x2  Stand to Sit: Total assistance;Assist x2  Bed to Chair: Total assistance;Assist x2(Stand pivot transfer)  Scooting:  Total assistance;Assist x2     Most Recent Physical Functioning:   Gross Assessment:  AROM: Grossly decreased, non-functional  Strength: Grossly decreased, non-functional  Coordination: Grossly decreased, non-functional(Does have strong  ) Posture:     Balance:  Sitting: Impaired  Sitting - Static: Fair (occasional)  Sitting - Dynamic: Fair (occasional)  Standing: Impaired  Standing - Static: Poor;Constant support  Standing - Dynamic : Poor;Constant support Bed Mobility:  Supine to Sit: Maximum assistance;Assist x2  Scooting: Total assistance;Assist x2  Wheelchair Mobility:     Transfers:  Sit to Stand: Total assistance;Assist x2  Stand to Sit: Total assistance;Assist x2  Stand Pivot Transfers: Total assistance;Assist x2  Bed to Chair: Total assistance;Assist x2(Stand pivot transfer)  Interventions: Safety awareness training; Tactile cues; Verbal cues            Patient Vitals for the past 6 hrs:   BP BP Patient Position SpO2 Pulse   20 0734 132/71 At rest 94 % 67       Mental Status  Neurologic State: Confused  Orientation Level: Disoriented X4  Cognition: Decreased command following, Decreased attention/concentration                          Physical Skills Involved:  1. Range of Motion  2. Balance  3. Strength  4. Activity Tolerance  5. Fine Motor Control  6. Gross Motor Control  7. Pain (acute) Cognitive Skills Affected (resulting in the inability to perform in a timely and safe manner):  1. Perception  2. Executive Function  3. Long Term Memory  4. Sustained Attention  5. Divided Attention  6. Expression Psychosocial Skills Affected:  1. Habits/Routines  2. Environmental Adaptation   Number of elements that affect the Plan of Care: 5+:  HIGH COMPLEXITY   CLINICAL DECISION MAKIN [facility] [address] [address] AM-PAC 6 Clicks   Daily Activity Inpatient Short Form  How much help from another person does the patient currently need. .. Total A Lot A Little None   1. Putting on and taking off regular lower body clothing? [x] 1   [] 2   [] 3   [] 4   2. Bathing (including washing, rinsing, drying)? [x] 1   [] 2   [] 3   [] 4   3. Toileting, which includes using toilet, bedpan or urinal?   [x] 1   [] 2   [] 3   [] 4   4.   Putting on and taking off regular upper body clothing? [x] 1   [] 2   [] 3   [] 4   5. Taking care of personal grooming such as brushing teeth? [] 1   [x] 2   [] 3   [] 4   6. Eating meals? [] 1   [x] 2   [] 3   [] 4   © 2007, Trustees of [address] [place] [address] [address] [id number], under license to [employer]. All rights reserved      Score:  Initial: 8, completed, 2/24/2020 Most Recent: X (Date: -- )    Interpretation of Tool:  Represents activities that are increasingly more difficult (i.e. Bed mobility, Transfers, Gait). Medical Necessity:     · Skilled intervention continues to be required due to decreased independence, safety, balance, strength, cognition, and activity tolerance for performance of ADLs and functional mobility. .  Reason for Services/Other Comments:  · Patient continues to require skilled intervention due to medical complications and patient unable to attend/participate in therapy as expected. Use of outcome tool(s) and clinical judgement create a POC that gives a: HIGH COMPLEXITY         TREATMENT:   (In addition to Assessment/Re-Assessment sessions the following treatments were rendered)     Pre-treatment Symptoms/Complaints:  Pt nonverbal for majority of session. Verbalizes she is \"Okay,\" prior to therapist departure. Pain: Initial:   Pain Intensity 1: 5  Post Session:  Unchanged     Self Care: (23 minutes): Procedure(s) utilized to improve and/or restore self-care/home management as related to dressing and self feeding. Required maximal visual, verbal, manual and tactile cueing to facilitate activities of daily living skills. Pt requires Total A to don/doff socks, despite maximal verbal, visual, and tactile cueing from therapy. Pt Total A for gown management throughout session. Pt required Max A for feeding to bring cup to mouth prior to and following functional mobility. Pt able to manage straw using mouth to drink liquid.  Pt unable to verbalize whether or not she would like more to drink when provided with maximal verbal and visual cueing. Braces/Orthotics/Lines/Etc:   · IV  · Pure Wick Catheter   · KI  · Bilateral mittens  Treatment/Session Assessment:    · Response to Treatment:  Resistive to moving with therapy. · Interdisciplinary Collaboration:   o Physical Therapist  o Occupational Therapist  o Registered Nurse  · After treatment position/precautions:   o Up in chair  o Bed/Chair-wheels locked  o Bed in low position  o Call light within reach  o RN notified  o Posey Chair Alarm   · Compliance with Program/Exercises: Noncompliant some of the time, Will assess as treatment progresses. · Recommendations/Intent for next treatment session: \"Next visit will focus on advancements to more challenging activities and reduction in assistance provided\".   Total Treatment Duration:  OT Patient Time In/Time Out  Time In: 0856  Time Out: 0934     Dai Damon

## 2020-02-24 NOTE — PROGRESS NOTES
[facility] FRACTURE PROGRESS NOTE    2020  Admit Date:   2020    Post Op day: 1 Day Post-Op    Subjective:    Waleska Rachel demented but appears stab le    PT/OT:   Gait:                    Vital Signs:    Patient Vitals for the past 8 hrs:   BP Temp Pulse Resp SpO2   20 2309 120/63 98.2 °F (36.8 °C) 98 18 93 %     Temp (24hrs), Av.1 °F (37.3 °C), Min:98.2 °F (36.8 °C), Max:100.4 °F (38 °C)      Pain Control:   Pain Assessment  Pain Scale 1: FLACC  Pain Intensity 1: 0    Meds:    Current Facility-Administered Medications   Medication Dose Route Frequency    memantine (NAMENDA) tablet 10 mg  10 mg Oral BID    0.9% sodium chloride infusion  100 mL/hr IntraVENous CONTINUOUS    ondansetron (ZOFRAN) injection 4 mg  4 mg IntraVENous Q6H PRN    sodium chloride (NS) flush 5-40 mL  5-40 mL IntraVENous Q8H    sodium chloride (NS) flush 5-40 mL  5-40 mL IntraVENous PRN    acetaminophen (TYLENOL) tablet 650 mg  650 mg Oral Q6H PRN    oxyCODONE IR (ROXICODONE) tablet 5 mg  5 mg Oral Q6H PRN    HYDROmorphone (PF) (DILAUDID) injection 0.5 mg  0.5 mg IntraVENous Q3H PRN    famotidine (PF) (PEPCID) 20 mg in 0.9% sodium chloride 10 mL injection  20 mg IntraVENous Q24H    0.9% sodium chloride infusion  75 mL/hr IntraVENous CONTINUOUS    sodium chloride (NS) flush 5-40 mL  5-40 mL IntraVENous Q8H    sodium chloride (NS) flush 5-40 mL  5-40 mL IntraVENous PRN    acetaminophen (TYLENOL) tablet 650 mg  650 mg Oral Q8H    tuberculin injection 5 Units  5 Units IntraDERMal ONCE    oxyCODONE IR (ROXICODONE) tablet 10 mg  10 mg Oral Q3H PRN    lactated Ringers infusion  25 mL/hr IntraVENous CONTINUOUS    donepeziL (ARICEPT) tablet 10 mg  10 mg Oral QHS    memantine (NAMENDA) tablet 5 mg  5 mg Oral BID    lactated Ringers infusion  50 mL/hr IntraVENous CONTINUOUS    sodium chloride (NS) flush 5-40 mL  5-40 mL IntraVENous Q8H    sodium chloride (NS) flush 5-40 mL  5-40 mL IntraVENous PRN    ceFAZolin (ANCEF) 1,000 mg in 0.9% sodium chloride (MBP/ADV) 50 mL ADV  1 g IntraVENous Q8H    ondansetron (ZOFRAN) injection 4 mg  4 mg IntraVENous Q4H PRN    alum-mag hydroxide-simeth (MYLANTA) oral suspension 30 mL  30 mL Oral Q4H PRN    calcium-vitamin D (OS-TYRA) 500 mg-200 unit tablet  1 Tab Oral TID WITH MEALS    HYDROcodone-acetaminophen (NORCO) 7.5-325 mg per tablet 1 Tab  1 Tab Oral Q6H PRN    HYDROmorphone (PF) (DILAUDID) injection 0.2 mg  0.2 mg IntraVENous Q6H PRN    enoxaparin (LOVENOX) injection 30 mg  30 mg SubCUTAneous DAILY       LAB:    Recent Labs     02/24/20  0542  02/23/20  0809   HCT 37.5   < > 43.4   HGB 12.2   < > 14.6   INR  --   --  1.1    < > = values in this interval not displayed.        24 Hour Assessment Issues:    Disoriented (baseline)    Discharge Planning: SNF    Transfuse PRBC's:      Assessment & Physician's Comment:  Dressing is clean, dry, and intact  Neurovascular checks within normal limits    Active Problems:    Hip fracture ([address] (2/23/2020)      Dementia ([facility] (2/23/2020)        Plan:Appears ready for dispo to SNF dementia unit      Cl Decree, DO

## 2020-02-24 NOTE — PROGRESS NOTES
Problem: Mobility Impaired (Adult and Pediatric)  Goal: *Acute Goals and Plan of Care (Insert Text)  Description  ST. Patient will perform bed mobility with MODERATE ASSISTANCE within 3 days. 2. Patient will transfer bed to chair with MODERATE ASSISTANCE x2 within 3 days. 3. Patient will demonstrate GOOD DYNAMIC SITTING balance within 3 day(s). 4. Patient will tolerate 15+ minutes of therapeutic activity/exercise and/or neuromuscular re-education while maintaining stable vitals to improve functional strength and activity tolerance within 3 days. LT. Patient will perform bed mobility with MINIMAL ASSISTANCE within 7 days. 2. Patient will transfer bed to chair with MODERATE ASSISTANCE x1 within 7 days. 3. Patient will demonstrate FAIR DYNAMIC STANDING balance within 7 day(s). 4. Patient will ambulate 10ft+ using least restrictive assistive device to assist with transfers with MODERATE ASSISTANCE x1 within 7 days. 5. Patient will tolerate 25+ minutes of therapeutic activity/exercise and/or neuromuscular re-education while maintaining stable vitals to improve functional strength and activity tolerance within 7 days. Will advance goals as patient is able. Outcome: Progressing Towards Goal       PHYSICAL THERAPY: Daily Note and PM 2020  INPATIENT: PT Visit Days : 1  Payor: Damien Bello / Plan: [address] PPO / Product Type: PPO /      WBAT R LE; Hip Precautions  KI in bed/chair     NAME/AGE/GENDER: Jose Cruz Zambrano is a 80 y.o. female   PRIMARY DIAGNOSIS: Hip fracture ([facility] [S72.009A] <principal problem not specified> <principal problem not specified>  Procedure(s) (LRB):  RIGHT HIP HEMIARTHROPLASTY (Right)  1 Day Post-Op  ICD-10: Treatment Diagnosis:    · Difficulty in walking, Not elsewhere classified (R26.2)  · History of falling (Z91.81)   Precaution/Allergies:  Patient has no known allergies.       ASSESSMENT:     Ms. Yusef Diallo is a 80year old female admitted from [facility] s/p fall with resultant right femoral neck fracture. Patient is now 1 day s/p R hip hemiarthroplasty and is WBAT R LE with hip precautions and KI in bed/chair. PM: Patient presents in chair, disoriented x4, strong left lateral lean, and appears in NAD. Performed supported sitting balance activities to attempt to achieve midline (noted below); patient with stronger left lateral lean this PM and RN notified. Performed gentle AAROM exercises to R LE to improve mobility and coordination. Performed squat pivot transfer chair to bed with total assistance x2. Mid transfer patient became agitated requiring total assistance for scooting back onto bed and for sit to supine transfer. Hip precautions maintained throughout; KI replaced s/p treatment. Patient calmed quickly in supine. FLACC 5 with mobility and 1 in supine. Patient persists with left lateral and resistive to repositioning. Positioned in midline and upright. RN aware of patient status. Daughter to bedside and confirmed patient is ambulatory at baseline. Goals remain ongoing; required additional assistance this PM then this AM. Recommend STR and SNF at D/C. This section established at most recent assessment   PROBLEM LIST (Impairments causing functional limitations):  1. Decreased Strength  2. Decreased ADL/Functional Activities  3. Decreased Transfer Abilities  4. Decreased Ambulation Ability/Technique  5. Decreased Balance  6. Increased Pain  7. Decreased Activity Tolerance  8. Decreased Flexibility/Joint Mobility  9. Decreased Knowledge of Precautions  10. Decreased Cognition   INTERVENTIONS PLANNED: (Benefits and precautions of physical therapy have been discussed with the patient.)  1. Balance Exercise  2. Bed Mobility  3. Family Education  4. Gait Training  5. Group Therapy  6. Home Exercise Program (HEP)  7. Neuromuscular Re-education/Strengthening  8. Range of Motion (ROM)  9. Therapeutic Activites  10.  Therapeutic Exercise/Strengthening  11. Transfer Training     TREATMENT PLAN: Frequency/Duration: BID for duration of hospital stay  Rehabilitation Potential For Stated Goals: [facility] (at time of discharge pending progress):    Placement: It is my opinion, based on this patient's performance to date, that Ms. Astrid Castro may benefit from intensive therapy at a [address] [place] [address] after discharge due to the functional deficits listed above that are likely to improve with skilled rehabilitation and concerns that he/she may be unsafe to be unsupervised at home due to decreased functional mobility and functional activity tolerance. Equipment:    TBD pending progress               HISTORY:   History of Present Injury/Illness (Reason for Referral):  S/po right hip hemiarthroplasty  Past Medical History/Comorbidities:   Ms. Astrid Castro  has a past medical history of Aortic stenosis and Aortic valve stenosis (5/12/2016). Ms. Astrid Castro  has no past surgical history on file. Social History/Living Environment:   Home Environment: Skilled nursing facility(Memory Care Unit)  [address] [place] [address] Name: [facility]: One story  Living Alone: No  Support Systems: 2 [facility]  Patient Expects to be Discharged to[de-identified] 2 [facility]  Current DME Used/Available at Home: Katie Banks, [address] chair  Prior Level of Function/Work/Activity:  Per chart review, patient is a resident of a memory care unit, requires assistance with ADLs, and unsure of ambulatory status.    Dominant Side:         RIGHT   Number of Personal Factors/Comorbidities that affect the Plan of Care: 0: LOW COMPLEXITY   EXAMINATION:   Most Recent Physical Functioning:   Gross Assessment:  AROM: Generally decreased, functional  Strength: Generally decreased, functional  Sensation: Intact(noxious B LE)               Posture:     Balance:  Sitting: Impaired  Sitting - Static: Poor (constant support)  Sitting - Dynamic: Poor (constant support)  Standing: Impaired  Standing - Static: Poor  Standing - Dynamic : Poor Bed Mobility:  Supine to Sit: Maximum assistance;Assist x2  Sit to Supine: Total assistance;Assist x2  Scooting: Total assistance;Assist x2  Wheelchair Mobility:     Transfers:  Sit to Stand: Total assistance;Assist x2  Stand to Sit: Total assistance;Assist x2  Stand Pivot Transfers: Total assistance;Assist x2  Bed to Chair: Total assistance;Assist x2  Interventions: Safety awareness training; Tactile cues; Verbal cues  Gait:            Body Structures Involved:  1. Bones  2. Joints  3. Muscles Body Functions Affected:  1. Neuromusculoskeletal  2. Movement Related Activities and Participation Affected:  1. Mobility  2. Self Care   Number of elements that affect the Plan of Care: 4+: HIGH COMPLEXITY   CLINICAL PRESENTATION:   Presentation: Evolving clinical presentation with changing clinical characteristics: MODERATE COMPLEXITY   CLINICAL DECISION MAKIN [facility] Inpatient Short Form  How much difficulty does the patient currently have. .. Unable A Lot A Little None   1. Turning over in bed (including adjusting bedclothes, sheets and blankets)? [] 1   [x] 2   [] 3   [] 4   2. Sitting down on and standing up from a chair with arms ( e.g., wheelchair, bedside commode, etc.)   [x] 1   [] 2   [] 3   [] 4   3. Moving from lying on back to sitting on the side of the bed? [] 1   [x] 2   [] 3   [] 4   How much help from another person does the patient currently need. .. Total A Lot A Little None   4. Moving to and from a bed to a chair (including a wheelchair)? [x] 1   [] 2   [] 3   [] 4   5. Need to walk in hospital room? [x] 1   [] 2   [] 3   [] 4   6. Climbing 3-5 steps with a railing? [x] 1   [] 2   [] 3   [] 4   © , Trustees of [facility] [employer], under license to [employer].  All rights reserved      Score:  Initial: 8 Most Recent: 8 (Date: 2020 ) Interpretation of Tool:  Represents activities that are increasingly more difficult (i.e. Bed mobility, Transfers, Gait). Medical Necessity:     · Patient demonstrates   · fair  ·  rehab potential due to higher previous functional level. Reason for Services/Other Comments:  · Patient continues to require skilled intervention due to   · Decreased functional mobility and balance/gait status from baseline   · . Use of outcome tool(s) and clinical judgement create a POC that gives a: Clear prediction of patient's progress: LOW COMPLEXITY            TREATMENT:   (In addition to Assessment/Re-Assessment sessions the following treatments were rendered)   Pre-treatment Symptoms/Complaints:    Pain: Initial:   Pain Intensity 1: 5  Pain Location 1: Hip, Leg  Pain Orientation 1: Right  Pain Intervention(s) 1: Emotional support, Repositioned, Rest  Post Session:  FLACC 0 in chair at end of session     Neuromuscular Re-education: ( 40 Minutes):  Exercise/activities including bed mobility and transfers with facilitation and cueing, static and dynamic sitting and standing balance with dynamic UE use for self feeding (holding and drinking ensure, paced patient), cueing to oriented right side and for positioning in midline to improve balance, posture, and functional mobility . Required total assistance for mobility as well as  visual, verbal, and tactile cues to promote static and dynamic balance in sitting. Braces/Orthotics/Lines/Etc:   · IV  · Purwick    · O2 Device: Nasal cannula  Treatment/Session Assessment:    · Response to Treatment:  See above.   · Interdisciplinary Collaboration:   o Physical Therapist  o Registered Nurse  · After treatment position/precautions:   o Supine in bed  o Bed alarm/tab alert on  o Bed/Chair-wheels locked  o Bed in low position  o Call light within reach  o RN notified  o mitts replaced; positoned in midline; RN to assess patient; family at bedside; bed alarm activated · Compliance with Program/Exercises: Will assess as treatment progresses  · Recommendations/Intent for next treatment session: \"Next visit will focus on advancements to more challenging activities and reduction in assistance provided\".   Total Treatment Duration:  PT Patient Time In/Time Out  Time In: 2463  Time Out: [address]

## 2020-02-24 NOTE — PROGRESS NOTES
Patient reported by telemetry to have on- off Afib/ sinus tachycardia. HR between  bpm. Patient asymtomatic. She is NOT a candidate for anticoagulation. Will start her on lopressor bid. Ordered IV lopressor prn. EKG and chest x ray ordered. Will continue monitoring her VS and clinical progress.  Patient is DNR

## 2020-02-24 NOTE — PROGRESS NOTES
Referral submitted to [facility] requesting a rehab bed for Wednesday. Awaiting a response. 1001:  Pt accepted for admission for Wednesday if she is medically cleared for discharge. SW following to facilitate pt's transfer at that time. TC to pt's dtr. Call went straight to voice mail and mailbox was full-unable to leave a message. SW will follow up with her later today. 1500:  BARBARA was notified that the pt would have a substantial copay to go to the [facility] since they are not in network with the pt's insurance. BARBARA met with pt's dtr and son to discuss other options. From a list of facilities in network with the pt's insurance, they requested referrals to [facility] and [facility]. Referrals submitted. Awaiting responses.

## 2020-02-24 NOTE — OP NOTES
[facility]  OPERATIVE REPORT    Name:  Cassius Orellana  MR#:  [id number]  :  10/18/1930  ACCOUNT #:  [de-identified]  DATE OF SERVICE:  2020    PREOPERATIVE DIAGNOSIS:  Femoral neck fracture of the right hip. POSTOPERATIVE DIAGNOSIS:  Femoral neck fracture of the right hip. PROCEDURE PERFORMED:  Hemiarthroplasty of the right hip. SURGEON:  Carl Harris MD    ASSISTANT:      SURGICAL STAFF:  Include Josefina Valladares as circulator, scrub techs include Drew Umana and Mindi Sever. Georgia Lieberman. ANESTHESIA:  General.    COMPLICATIONS:  None. SPECIMENS REMOVED:  Femoral head. IMPLANTS:  #7 Actis DuoFix prosthesis, femoral self-centering head 48 x 28 with a 28 +5 femoral head. ESTIMATED BLOOD LOSS:  100 mL. HOSPITAL COURSE:  This is a demented 59-year-old female patient who had been noted to have fallen in the nursing home. She began complaining of discomfort in her right hip. She was transported following an x-ray taken in the nursing facility which revealed a fracture of the femoral head. The patient sustained a similar injury approximately 2 years prior and underwent hip hemiarthroplasty at that time. PROCEDURE:  The patient was placed in supine position under general anesthetic and a well-padded positioning pillow then used. Once the patient was carefully positioned, was prepared, draped and presented as surgical field and time-out process performed. An extensile incision made along the line of the femur centered at the greater trochanter was made longitudinally and carried down to subcutaneous tissue. The fascia darrian was then split the length of the incision and gluteal musculature anteriorly then identified and detached at its anterior one-third and retracted superiorly. Hip capsule was identified and cut in a T fashion to allow for the hip capsule to be identified and retracted. The femur was then placed in a figure-of-four position and externally rotated. Once this was positioned, sequential reaming and rasping of the intramedullary canal with calcar planing was then performed. #7 was selected. The femoral canal was then cleansed with tranexamic acid and carefully held there. The leg was then carefully placed back on the table and the femoral head removed with the use of a corkscrew device. It was measured and selected; the #5 head was also selected. Trial reduction with trial implants was noted and then re-dislocated. Once this was accomplished, copious irrigation was then performed one further time and final implants were then impacted with little difficulty. The hip was then reduced and tranexamic acid was then again instituted for another additional 5 minutes. Once this was accomplished gluteal musculature reattached to the femur utilizing drill holes and #5 Ethibond. The fascia was then closed with #1 PDS. The skin closed with 2-0 Vicryl as well as staples. The patient taken to recovery room having tolerated the procedure quite nicely.         Arlene Muñiz MD      [id number]  D:  02/23/2020 17:16  T:  02/24/2020 1:08  JOB #:  [id number]

## 2020-02-25 LAB
ANION GAP SERPL CALC-SCNC: 4 MMOL/L (ref 7–16)
BASOPHILS # BLD: 0 K/UL (ref 0–0.2)
BASOPHILS NFR BLD: 0 % (ref 0–2)
BUN SERPL-MCNC: 28 MG/DL (ref 8–23)
CALCIUM SERPL-MCNC: 8.9 MG/DL (ref 8.3–10.4)
CHLORIDE SERPL-SCNC: 112 MMOL/L (ref 98–107)
CO2 SERPL-SCNC: 29 MMOL/L (ref 21–32)
CREAT SERPL-MCNC: 0.62 MG/DL (ref 0.6–1)
DIFFERENTIAL METHOD BLD: ABNORMAL
EOSINOPHIL # BLD: 0.1 K/UL (ref 0–0.8)
EOSINOPHIL NFR BLD: 1 % (ref 0.5–7.8)
ERYTHROCYTE [DISTWIDTH] IN BLOOD BY AUTOMATED COUNT: 13.9 % (ref 11.9–14.6)
GLUCOSE SERPL-MCNC: 105 MG/DL (ref 65–100)
HCT VFR BLD AUTO: 33 % (ref 35.8–46.3)
HGB BLD-MCNC: 10.8 G/DL (ref 11.7–15.4)
IMM GRANULOCYTES # BLD AUTO: 0.1 K/UL (ref 0–0.5)
IMM GRANULOCYTES NFR BLD AUTO: 1 % (ref 0–5)
LYMPHOCYTES # BLD: 1.2 K/UL (ref 0.5–4.6)
LYMPHOCYTES NFR BLD: 13 % (ref 13–44)
MCH RBC QN AUTO: 32.7 PG (ref 26.1–32.9)
MCHC RBC AUTO-ENTMCNC: 32.7 G/DL (ref 31.4–35)
MCV RBC AUTO: 100 FL (ref 79.6–97.8)
MM INDURATION POC: 0 MM (ref 0–5)
MONOCYTES # BLD: 1 K/UL (ref 0.1–1.3)
MONOCYTES NFR BLD: 11 % (ref 4–12)
NEUTS SEG # BLD: 6.7 K/UL (ref 1.7–8.2)
NEUTS SEG NFR BLD: 74 % (ref 43–78)
NRBC # BLD: 0 K/UL (ref 0–0.2)
PLATELET # BLD AUTO: 169 K/UL (ref 150–450)
PMV BLD AUTO: 11.2 FL (ref 9.4–12.3)
POTASSIUM SERPL-SCNC: 4 MMOL/L (ref 3.5–5.1)
PPD POC: NEGATIVE NEGATIVE
RBC # BLD AUTO: 3.3 M/UL (ref 4.05–5.2)
SODIUM SERPL-SCNC: 145 MMOL/L (ref 136–145)
WBC # BLD AUTO: 9.1 K/UL (ref 4.3–11.1)

## 2020-02-25 PROCEDURE — 74011250636 HC RX REV CODE- 250/636: Performed by: ORTHOPAEDIC SURGERY

## 2020-02-25 PROCEDURE — 74011250636 HC RX REV CODE- 250/636: Performed by: INTERNAL MEDICINE

## 2020-02-25 PROCEDURE — 97530 THERAPEUTIC ACTIVITIES: CPT

## 2020-02-25 PROCEDURE — 77010033678 HC OXYGEN DAILY

## 2020-02-25 PROCEDURE — 74011000250 HC RX REV CODE- 250: Performed by: INTERNAL MEDICINE

## 2020-02-25 PROCEDURE — 65660000000 HC RM CCU STEPDOWN

## 2020-02-25 PROCEDURE — 74011250637 HC RX REV CODE- 250/637: Performed by: ORTHOPAEDIC SURGERY

## 2020-02-25 PROCEDURE — 36415 COLL VENOUS BLD VENIPUNCTURE: CPT

## 2020-02-25 PROCEDURE — 74011250637 HC RX REV CODE- 250/637: Performed by: INTERNAL MEDICINE

## 2020-02-25 PROCEDURE — 80048 BASIC METABOLIC PNL TOTAL CA: CPT

## 2020-02-25 PROCEDURE — 77030038269 HC DRN EXT URIN PURWCK BARD -A

## 2020-02-25 PROCEDURE — 94762 N-INVAS EAR/PLS OXIMTRY CONT: CPT

## 2020-02-25 PROCEDURE — 85025 COMPLETE CBC W/AUTO DIFF WBC: CPT

## 2020-02-25 RX ORDER — TRAMADOL HYDROCHLORIDE 50 MG/1
50 TABLET ORAL
Status: DISCONTINUED | OUTPATIENT
Start: 2020-02-25 | End: 2020-02-26 | Stop reason: HOSPADM

## 2020-02-25 RX ADMIN — METOPROLOL TARTRATE 12.5 MG: 25 TABLET, FILM COATED ORAL at 09:07

## 2020-02-25 RX ADMIN — MEMANTINE 10 MG: 5 TABLET ORAL at 17:35

## 2020-02-25 RX ADMIN — Medication 1 TABLET: at 09:07

## 2020-02-25 RX ADMIN — Medication 5 ML: at 05:56

## 2020-02-25 RX ADMIN — MEMANTINE 10 MG: 5 TABLET ORAL at 09:07

## 2020-02-25 RX ADMIN — FAMOTIDINE 20 MG: 10 INJECTION INTRAVENOUS at 11:17

## 2020-02-25 RX ADMIN — METOPROLOL TARTRATE 12.5 MG: 25 TABLET, FILM COATED ORAL at 21:11

## 2020-02-25 RX ADMIN — ACETAMINOPHEN 650 MG: 325 TABLET, FILM COATED ORAL at 11:17

## 2020-02-25 RX ADMIN — Medication 1 TABLET: at 17:35

## 2020-02-25 RX ADMIN — ENOXAPARIN SODIUM 30 MG: 30 INJECTION SUBCUTANEOUS at 09:07

## 2020-02-25 RX ADMIN — Medication 1 TABLET: at 11:17

## 2020-02-25 RX ADMIN — Medication 10 ML: at 14:24

## 2020-02-25 RX ADMIN — Medication 5 ML: at 21:11

## 2020-02-25 NOTE — PROGRESS NOTES
Progress Note    Patient: Roman Merritt MRN: [id number]  SSN: [id number]    YOB: 1930  Age: 80 y.o. Sex: female      Admit Date: 2/23/2020    LOS: 2 days     Subjective: This is an 66-year-old female patient with a past medical history of dementia, aortic stenosis, resident of a nursing home who had a mechanical fall on 2/22/2020. After the fall it was noticed that the patient was not able to move the right lower extremity and it was internally rotated and shortened. An x-ray was done today and reported a right hip fracture. The patient was sent into the emergency room to be evaluated here. SHe was found to have a fracture of the neck of the right femur. Due to her age she was felt high risk for surgery. Family agreed for surgical correction. She had a R hip replacement done on 2/23/20. She remains very weak after the surgery. Tends to lean towards her left side while on bed,  which according to her family is her baseline.    On 2/24 her telemetry reported on- off Afib/ sinus tachycardia. HR between  bpm. Patient asymtomatic. She is NOT a candidate for anticoagulation. Started  on lopressor bid. Ordered IV lopressor prn. EKG showed sinus tachy with APCs. Chest x ray with LLL atelectasis vs infiltrate. Will monitor. Prognosis is guarded. Family aware. She is DNR. Today: Continues to be weak but no worsening shortness of breath or signs of pneumonia. Patient has been on supplemental oxygen since coming back from surgery per son's report. I have placed the patient on continuous pulse oximetry and asked the nursing staff to wean the patient's oxygen. The patient continues have a submental oxygen requirement I will expand work-up for pulmonary pathology. Therapy services recommend SNF.     Objective:     Vitals:    02/25/20 0400 02/25/20 0736 02/25/20 1225 02/25/20 1526   BP: 127/68 144/73 146/78 137/72   Pulse: 82 82 80 78   Resp: 17 16 16 17   Temp: 98.2 °F (36.8 °C) 97.7 °F (36.5 °C) 97.8 °F (36.6 °C) 97.6 °F (36.4 °C)   SpO2: 98% 99% 100% 90%   Weight:       Height:            Intake and Output:  Current Shift: 02/25 0701 - 02/25 1900  In: 30 [P.O.:30]  Out: 125 [Urine:125]  Last three shifts: 02/23 1901 - 02/25 0700  In: 79 [P.O.:70]  Out: 600 [Urine:600]    Physical Exam:   GENERAL: alert, appears stated age  EYE: conjunctivae/corneas clear. LYMPHATIC: Cervical, supraclavicular, and axillary nodes normal.   THROAT & NECK: normal and no erythema or exudates noted. LUNG: clear to auscultation bilaterally  HEART: regular rate and rhythm, S1, S2 normal, no murmur, click, rub or gallop  ABDOMEN: soft, non-tender. Bowel sounds normal. No masses,  no organomegaly  EXTREMITIES: S/P R hip replacement   NEUROLOGIC: No focal deficits     Lab/Data Review: All lab results for the last 24 hours reviewed. Assessment:     Principal Problem:    Hip fracture ([facility] (2/23/2020)    Active Problems: Aortic stenosis (5/12/2016)      Dementia ([facility] (2/23/2020)        Plan:     -gentle hydration with IV fluids  -monitor VS  -telemetry reported on- off Afib. Strips in paper chart. On oral lopressor BID. Lopressor IV prn. NOt a candidate for anticoagulation. Monitor. HR between    -pain control   -history of aortic stenosis. Avoid overhydration  -Supplemental oxygen requirement. No signs of pneumonia. Continuous pulse oximetry and wean oxygen as able. If unable to wean, will expand work-up for pulmonary pathology. -patient using ONLY namenda 10 mg po bid at her nursing home. NOT on Aricept. See medication list in paper chart.  Med rec in EMR NOT accurate.  -Physical therapy recommend placement to SNF      Signed By: Minda Ta MD     February 25, 2020

## 2020-02-25 NOTE — PROGRESS NOTES
Progress Note    Patient: Coco Das MRN: [id number]  SSN: [id number]    YOB: 1930  Age: 80 y.o. Sex: female      Admit Date: 2/23/2020    LOS: 1 day     Subjective: This is an 49-year-old female patient with a past medical history of dementia, aortic stenosis, resident of a nursing home who had a mechanical fall on 2/22/2020. After the fall it was noticed that the patient was not able to move the right lower extremity and it was internally rotated and shortened. An x-ray was done today and reported a right hip fracture. The patient was sent into the emergency room to be evaluated here. SHe was found to have a fracture of the neck of the right femur. Due to her age she was felt high risk for surgery. Family agreed for surgical correction. She had a R hip replacement done on 2/23/20. She remains very weak after the surgery. Tends to lean towards her left side while on bed,  which according to her family is her baseline.    On 2/24 her telemetry reported on- off Afib/ sinus tachycardia. HR between  bpm. Patient asymtomatic. She is NOT a candidate for anticoagulation. Started  on lopressor bid. Ordered IV lopressor prn. EKG showed sinus tachy with APCs. Chest x ray with LLL atelectasis vs infiltrate. Will monitor. Prognosis is guarded. Family aware. She is DNR. Objective:     Vitals:    02/24/20 0734 02/24/20 1140 02/24/20 1630 02/24/20 2000   BP: 132/71 148/76 137/73 149/71   Pulse: 67 64 65 (!) 102   Resp: 17 18 17 17   Temp: 98 °F (36.7 °C) 97.4 °F (36.3 °C) 97.6 °F (36.4 °C) 98.6 °F (37 °C)   SpO2: 94% 97% 96% 98%   Weight:       Height:            Intake and Output:  Current Shift: No intake/output data recorded. Last three shifts: 02/23 0701 - 02/24 1900  In: 120 [P.O.:70; I.V.:50]  Out: 800 [Urine:600]    Physical Exam:   GENERAL: alert, appears stated age  EYE: conjunctivae/corneas clear.    LYMPHATIC: Cervical, supraclavicular, and axillary nodes normal.   THROAT & NECK: normal and no erythema or exudates noted. LUNG: clear to auscultation bilaterally  HEART: regular rate and rhythm, S1, S2 normal, no murmur, click, rub or gallop  ABDOMEN: soft, non-tender. Bowel sounds normal. No masses,  no organomegaly  EXTREMITIES: S/P R hip replacement   NEUROLOGIC: No focal deficits     Lab/Data Review: All lab results for the last 24 hours reviewed. Assessment:     Active Problems:    Hip fracture ([facility] (2/23/2020)      Dementia ([facility] (2/23/2020)        Plan:     -gentle hydration with IV fluids  -monitor VS  -telemetry reported on- off Afib. Strips in paper chart. On oral lopressor BID. Lopressor IV prn. NOt a candidate for anticoagulation. Monitor. HR between    -pain control   -history of aortic stenosis. Avoid overhydration  -chest x ray with LLL atelectasis vs infiltrated. NO cough. NO sputum production. MOnitor  -patient using ONLY namenda 10 mg po bid at her nursing home. NOT on Aricept. See medication list in paper chart. Med rec in EMR NOT accurate.        Signed By: Laura Frye MD     February 24, 2020

## 2020-02-25 NOTE — PROGRESS NOTES
Problem: Mobility Impaired (Adult and Pediatric)  Goal: *Acute Goals and Plan of Care (Insert Text)  Description  ST. Patient will perform bed mobility with MODERATE ASSISTANCE within 3 days. 2. Patient will transfer bed to chair with MODERATE ASSISTANCE x2 within 3 days. 3. Patient will demonstrate GOOD DYNAMIC SITTING balance within 3 day(s). 4. Patient will tolerate 15+ minutes of therapeutic activity/exercise and/or neuromuscular re-education while maintaining stable vitals to improve functional strength and activity tolerance within 3 days. LT. Patient will perform bed mobility with MINIMAL ASSISTANCE within 7 days. 2. Patient will transfer bed to chair with MODERATE ASSISTANCE x1 within 7 days. 3. Patient will demonstrate FAIR DYNAMIC STANDING balance within 7 day(s). 4. Patient will ambulate 10ft+ using least restrictive assistive device to assist with transfers with MODERATE ASSISTANCE x1 within 7 days. 5. Patient will tolerate 25+ minutes of therapeutic activity/exercise and/or neuromuscular re-education while maintaining stable vitals to improve functional strength and activity tolerance within 7 days. Will advance goals as patient is able. Outcome: Progressing Towards Goal       PHYSICAL THERAPY: Daily Note and AM 2020  INPATIENT: PT Visit Days : 2  Payor: Ravindra Real / Plan: [address] PPO / Product Type: PPO /      WBAT R LE; Hip Precautions  KI in bed/chair     NAME/AGE/GENDER: Coco Das is a 80 y.o. female   PRIMARY DIAGNOSIS: Hip fracture ([facility] [S72.009A] Hip fracture ([facility] Hip fracture (HCC)  Procedure(s) (LRB):  RIGHT HIP HEMIARTHROPLASTY (Right)  2 Days Post-Op  ICD-10: Treatment Diagnosis:    · Difficulty in walking, Not elsewhere classified (R26.2)  · History of falling (Z91.81)   Precaution/Allergies:  Patient has no known allergies.       ASSESSMENT:     Ms. Michelle Dumont is a 80year old female admitted from [facility] s/p fall with resultant right femoral neck fracture. Patient is now 1 day s/p R hip hemiarthroplasty and is WBAT R LE with hip precautions and KI in bed/chair. Patient was sitting up on EOB with OT upon contact. Confused with decreased/no command following. Patient requires total assist x 2 for all mobility including SPT to recliner chair. Decreased weight bearing on BLE's. Poor tolerance of transfer. Overall slow progress towards physical therapy goals. Patient's goals listed above are still appropriate. Will continue skilled PT to address remaining deficits. This section established at most recent assessment   PROBLEM LIST (Impairments causing functional limitations):  1. Decreased Strength  2. Decreased ADL/Functional Activities  3. Decreased Transfer Abilities  4. Decreased Ambulation Ability/Technique  5. Decreased Balance  6. Increased Pain  7. Decreased Activity Tolerance  8. Decreased Flexibility/Joint Mobility  9. Decreased Knowledge of Precautions  10. Decreased Cognition   INTERVENTIONS PLANNED: (Benefits and precautions of physical therapy have been discussed with the patient.)  1. Balance Exercise  2. Bed Mobility  3. Family Education  4. Gait Training  5. Group Therapy  6. Home Exercise Program (HEP)  7. Neuromuscular Re-education/Strengthening  8. Range of Motion (ROM)  9. Therapeutic Activites  10. Therapeutic Exercise/Strengthening  11. Transfer Training     TREATMENT PLAN: Frequency/Duration: BID for duration of hospital stay  Rehabilitation Potential For Stated Goals: [facility] (at time of discharge pending progress):    Placement:   It is my opinion, based on this patient's performance to date, that Ms. Luis Morejon may benefit from intensive therapy at a [address] [place] [address] after discharge due to the functional deficits listed above that are likely to improve with skilled rehabilitation and concerns that he/she may be unsafe to be unsupervised at home due to decreased functional mobility and functional activity tolerance. Equipment:    TBD pending progress               HISTORY:   History of Present Injury/Illness (Reason for Referral):  S/po right hip hemiarthroplasty  Past Medical History/Comorbidities:   Ms. Sudha Medrano  has a past medical history of Aortic stenosis and Aortic valve stenosis (2016). Ms. Sudha Medrano  has no past surgical history on file. Social History/Living Environment:   Home Environment: Skilled nursing facility(Memory Care Unit)  [address] [place] [address] Name: [facility]: One story  Living Alone: No  Support Systems: [facility]  Patient Expects to be Discharged to[de-identified] [facility]  Current DME Used/Available at Home: Chloe Ward, [address] chair  Prior Level of Function/Work/Activity:  Per chart review, patient is a resident of a memory care unit, requires assistance with ADLs, and unsure of ambulatory status. Dominant Side:         RIGHT   Number of Personal Factors/Comorbidities that affect the Plan of Care: 0: LOW COMPLEXITY   EXAMINATION:   Most Recent Physical Functioning:   Gross Assessment:                  Posture:     Balance:  Sitting: Impaired  Sitting - Static: Poor (constant support)  Standing: Impaired  Standing - Static: Poor  Standing - Dynamic : Poor Bed Mobility:     Wheelchair Mobility:     Transfers:  Sit to Stand: Total assistance;Assist x2  Stand to Sit: Total assistance;Assist x2  Bed to Chair: Total assistance;Assist x2  Gait:            Body Structures Involved:  1. Bones  2. Joints  3. Muscles Body Functions Affected:  1. Neuromusculoskeletal  2. Movement Related Activities and Participation Affected:  1. Mobility  2.  Self Care   Number of elements that affect the Plan of Care: 4+: HIGH COMPLEXITY   CLINICAL PRESENTATION:   Presentation: Evolving clinical presentation with changing clinical characteristics: MODERATE COMPLEXITY   CLINICAL DECISION MAKIN [facility] [address] [address] AM-PAC 6 Clicks   Basic Mobility Inpatient Short Form  How much difficulty does the patient currently have. .. Unable A Lot A Little None   1. Turning over in bed (including adjusting bedclothes, sheets and blankets)? [] 1   [x] 2   [] 3   [] 4   2. Sitting down on and standing up from a chair with arms ( e.g., wheelchair, bedside commode, etc.)   [x] 1   [] 2   [] 3   [] 4   3. Moving from lying on back to sitting on the side of the bed? [] 1   [x] 2   [] 3   [] 4   How much help from another person does the patient currently need. .. Total A Lot A Little None   4. Moving to and from a bed to a chair (including a wheelchair)? [x] 1   [] 2   [] 3   [] 4   5. Need to walk in hospital room? [x] 1   [] 2   [] 3   [] 4   6. Climbing 3-5 steps with a railing? [x] 1   [] 2   [] 3   [] 4   © 2007, Trustees of [address] [place] [address], under license to [employer]. All rights reserved      Score:  Initial: 8 Most Recent: 8 (Date: 2/24/2020 )    Interpretation of Tool:  Represents activities that are increasingly more difficult (i.e. Bed mobility, Transfers, Gait). Medical Necessity:     · Patient demonstrates   · fair  ·  rehab potential due to higher previous functional level. Reason for Services/Other Comments:  · Patient continues to require skilled intervention due to   · Decreased functional mobility and balance/gait status from baseline   · . Use of outcome tool(s) and clinical judgement create a POC that gives a: Clear prediction of patient's progress: LOW COMPLEXITY            TREATMENT:   (In addition to Assessment/Re-Assessment sessions the following treatments were rendered)   Pre-treatment Symptoms/Complaints:  None  Pain: Initial:   Pain Intensity 1: 0  Post Session:  0     Therapeutic Activity: (    8 Minutes): Therapeutic activities including sitting balance, posture training, command following SPT training, and patient education to improve mobility, strength and balance.   Required maximal verbal, tactile and manual cues   to promote static and dynamic balance in sitting and promote coordination of bilateral, lower extremity(s). Braces/Orthotics/Lines/Etc:   · IV  · [place]    · O2 Device: Nasal cannula  Treatment/Session Assessment:    · Response to Treatment:  See above. · Interdisciplinary Collaboration:   o Physical Therapy Assistant  o Registered Nurse  · After treatment position/precautions:   o Up in chair  o Bed alarm/tab alert on  o Bed/Chair-wheels locked  o Call light within reach  o RN notified   · Compliance with Program/Exercises: Will assess as treatment progresses  · Recommendations/Intent for next treatment session: \"Next visit will focus on advancements to more challenging activities and reduction in assistance provided\".   Total Treatment Duration:  PT Patient Time In/Time Out  Time In: 1034  Time Out: Cameron [address] ESSENCE Ramirez

## 2020-02-25 NOTE — PROGRESS NOTES
Problem: Self Care Deficits Care Plan (Adult)  Goal: *Acute Goals and Plan of Care (Insert Text)  Description  1. Patient will complete lower body bathing and dressing with Max A and adaptive equipment as needed. 2. Patient will complete toileting with Max A and adaptive equipment as needed. 3. Patient will tolerate 23 minutes of OT treatment with 2 rest breaks to increase activity tolerance for ADLs. 4. Patient will complete functional transfers with Mod A x 2 and adaptive equipment as needed. 5. Patient will feed self entire meal with Mod A and adaptive utensils as needed. 6. Patient will attend to R side for 50% of treatment session with moderate verbal cues from therapist.      Timeframe: 7 visits     Outcome: Progressing Towards Goal    OCCUPATIONAL THERAPY: Daily Note and AM    2/25/2020  INPATIENT: OT Visit Days: 2  Payor: Bernard Coto / Plan: [address] PPO / Product Type: PPO /      NAME/AGE/GENDER: Gala Kovacs is a 80 y.o. female   PRIMARY DIAGNOSIS:  Hip fracture ([facility] [S72.009A] Hip fracture ([address] Hip fracture ([address]  Procedure(s) (LRB):  RIGHT HIP HEMIARTHROPLASTY (Right)  2 Days Post-Op  ICD-10: Treatment Diagnosis:    · Generalized Muscle Weakness (M62.81)  · Difficulty in walking, Not elsewhere classified (R26.2)  · Other abnormalities of gait and mobility (R26.89)   Precautions/Allergies:    WBAT R LE Patient has no known allergies. ASSESSMENT:     Ms. Mary Ann Zuñiga is a 80 y.o. female admitted after fall on 2/22/2020 and found to have R hip fracture. Now s/p R Hip Hemiarthroplasty and WBAT in R LE. Pt with prior diagnosis of Dementia thus majority of information from medical chart review provided by daughter. At baseline, patient lives in [facility]. Pt receiving assist for ADLs and dependent on staff for IADLs. Family in area and assist pt with laundry.  Pt using walker and shower chair at SNF, but unclear as to how often or for in home or community distances. 2/25/2020 Pt presents in supine upon arrival with bilateral mitts in place and her son at her side. Pt did not follow commands and required total assist x's 2 with all mobility. Pt left up in the chair with cally on and his son at her side. Good effort. Continue POC. At this time, patient is appropriate for Co-treatment with physical therapy due to patient's decreased overall endurance/tolerance levels, as well as need for high level skilled assistance and cueing to complete functional transfers/mobility and functional tasks. Pt is appropriate for a multidisciplinary co-treatment of PT and OT to address goals of both disciplines. This section established at most recent assessment   PROBLEM LIST (Impairments causing functional limitations):  1. Decreased Strength  2. Decreased ADL/Functional Activities  3. Decreased Transfer Abilities  4. Decreased Ambulation Ability/Technique  5. Decreased Balance  6. Increased Pain  7. Decreased Activity Tolerance  8. Decreased Flexibility/Joint Mobility  9. Decreased Knowledge of Precautions  10. Decreased Cognition   INTERVENTIONS PLANNED: (Benefits and precautions of occupational therapy have been discussed with the patient.)  1. Activities of daily living training  2. Adaptive equipment training  3. Balance training  4. Clothing management  5. Neuromuscular re-eduation  6. Re-evaluation  7. Therapeutic activity  8. Therapeutic exercise     TREATMENT PLAN: Frequency/Duration: Follow patient 6x/week to address above goals. Rehabilitation Potential For Stated Goals: [facility] (at time of discharge pending progress):    Placement:   It is my opinion, based on this patient's performance to date, that Ms. Jina Collazo may benefit from intensive therapy at a [address] [place] [address] after discharge due to the functional deficits listed above that are likely to improve with skilled rehabilitation and concerns that he/she may be unsafe to be unsupervised at home due to decreased independence, safety, balance, strength, and activity tolerance for performance of ADLs and functional mobility. .  Equipment:    TBD              OCCUPATIONAL PROFILE AND HISTORY:   History of Present Injury/Illness (Reason for Referral):  See H & P  Past Medical History/Comorbidities:   Ms. Humphrey Arita  has a past medical history of Aortic stenosis and Aortic valve stenosis (5/12/2016). Ms. Humphrey Arita  has no past surgical history on file. Social History/Living Environment:   Home Environment: Skilled nursing facility(Memory Care Unit)  [address] [place] [address] [facility] One [facility]  Living Alone: No  Support Systems: Skilled nursing facility  Patient Expects to be Discharged to[de-identified] [address] [place] [address]  Current DME Used/Available at Home: Linwood Sicard, [address] chair  Prior Level of Function/Work/Activity:  Pt with prior diagnosis of Dementia thus majority of information from medical chart review. At baseline, patient lives in [facility]. Pt receiving assist for ADLs and dependent on staff for IADLs. Family in area and assist pt with laundry. Pt using walker and shower chair at SNF, but unclear as to how often or for in home or community distances. Personal Factors:          Sex:  female        Age:  80 y.o. Number of Personal Factors/Comorbidities that affect the Plan of Care: Expanded review of therapy/medical records (1-2):  MODERATE COMPLEXITY   ASSESSMENT OF OCCUPATIONAL PERFORMANCE[de-identified]   Activities of Daily Living:   Basic ADLs (From Assessment) Complex ADLs (From Assessment)   Feeding: Maximum assistance  Oral Facial Hygiene/Grooming: Maximum assistance  Bathing: Total assistance  Upper Body Dressing: Total assistance  Lower Body Dressing: Total assistance  Toileting: Total assistance Instrumental ADL  Meal Preparation: Total assistance  Homemaking:  Total assistance  Medication Management: Total assistance Grooming/Bathing/Dressing Activities of Daily Living                             Bed/Mat Mobility  Supine to Sit: (P) Total assistance;Assist x2  Sit to Stand: (P) Total assistance;Assist x2  Stand to Sit: (P) Total assistance;Assist x2  Bed to Chair: (P) Total assistance;Assist x2  Scooting: (P) Total assistance;Assist x2     Most Recent Physical Functioning:   Gross Assessment:                  Posture:     Balance:  Sitting: (P) Impaired  Sitting - Static: (P) Poor (constant support)  Sitting - Dynamic: (P) Not tested  Standing: (P) Impaired  Standing - Static: (P) Poor  Standing - Dynamic : (P) Poor Bed Mobility:  Supine to Sit: (P) Total assistance;Assist x2  Scooting: (P) Total assistance;Assist x2  Wheelchair Mobility:     Transfers:  Sit to Stand: (P) Total assistance;Assist x2  Stand to Sit: (P) Total assistance;Assist x2  Bed to Chair: (P) Total assistance;Assist x2            Patient Vitals for the past 6 hrs:   BP BP Patient Position SpO2 Pulse   20 0736 144/73 At rest 99 % 82   20 1225 146/78 Sitting 100 % 80       Mental Status  Neurologic State: Confused  Orientation Level: Disoriented X4  Cognition: Impaired decision making                          Physical Skills Involved:  1. Range of Motion  2. Balance  3. Strength  4. Activity Tolerance  5. Fine Motor Control  6. Gross Motor Control  7. Pain (acute) Cognitive Skills Affected (resulting in the inability to perform in a timely and safe manner):  1. Perception  2. Executive Function  3. Long Term Memory  4. Sustained Attention  5. Divided Attention  6. Expression Psychosocial Skills Affected:  1. Habits/Routines  2. Environmental Adaptation   Number of elements that affect the Plan of Care: 5+:  HIGH COMPLEXITY   CLINICAL DECISION MAKIN [facility] [address] [address] AM-PAC 6 Clicks   Daily Activity Inpatient Short Form  How much help from another person does the patient currently need. .. Total A Lot A Little None   1.   Putting on and taking off regular lower body clothing? [x] 1   [] 2   [] 3   [] 4   2. Bathing (including washing, rinsing, drying)? [x] 1   [] 2   [] 3   [] 4   3. Toileting, which includes using toilet, bedpan or urinal?   [x] 1   [] 2   [] 3   [] 4   4. Putting on and taking off regular upper body clothing? [x] 1   [] 2   [] 3   [] 4   5. Taking care of personal grooming such as brushing teeth? [] 1   [x] 2   [] 3   [] 4   6. Eating meals? [] 1   [x] 2   [] 3   [] 4   © 2007, Trustees of [address] [place] [address] [address] [address], under license to [employer]. All rights reserved      Score:  Initial: 8, completed, 2/24/2020 Most Recent: X (Date: -- )    Interpretation of Tool:  Represents activities that are increasingly more difficult (i.e. Bed mobility, Transfers, Gait). Medical Necessity:     · Skilled intervention continues to be required due to decreased independence, safety, balance, strength, cognition, and activity tolerance for performance of ADLs and functional mobility. .  Reason for Services/Other Comments:  · Patient continues to require skilled intervention due to medical complications and patient unable to attend/participate in therapy as expected. Use of outcome tool(s) and clinical judgement create a POC that gives a: HIGH COMPLEXITY         TREATMENT:   (In addition to Assessment/Re-Assessment sessions the following treatments were rendered)     Pre-treatment Symptoms/Complaints:  Pt nonverbal for majority of session. Pain: Initial:   Pain Location 1: Hip, Leg  Pain Intervention(s) 1: Repositioned  Post Session:  Unchanged     Therapeutic Activity: (8 minutes): Therapeutic activities including Bed transfers, Chair transfers and bed to chair transfer to improve mobility, strength and balance. Required total assist x's 2 to promote static and dynamic balance in standing.          Braces/Orthotics/Lines/Etc:   · IV  · Pure Wick Catheter   · KI  · Bilateral mittens  Treatment/Session Assessment:    · Response to Treatment:  Resistive to moving with therapy. · Interdisciplinary Collaboration:   o Physical Therapy Assistant  o Certified Occupational Therapy Assistant  o Registered Nurse  · After treatment position/precautions:   o Up in chair  o Bed/Chair-wheels locked  o Bed in low position  o Call light within reach  o RN notified  o Family at bedside  o Posey Chair Alarm   · Compliance with Program/Exercises: Noncompliant some of the time, Will assess as treatment progresses. · Recommendations/Intent for next treatment session: \"Next visit will focus on advancements to more challenging activities and reduction in assistance provided\".   Total Treatment Duration:  OT Patient Time In/Time Out  Time In: 1025  Time Out: 107 [facility]

## 2020-02-25 NOTE — PROGRESS NOTES
Problem: Mobility Impaired (Adult and Pediatric)  Goal: *Acute Goals and Plan of Care (Insert Text)  Description  ST. Patient will perform bed mobility with MODERATE ASSISTANCE within 3 days. 2. Patient will transfer bed to chair with MODERATE ASSISTANCE x2 within 3 days. 3. Patient will demonstrate GOOD DYNAMIC SITTING balance within 3 day(s). 4. Patient will tolerate 15+ minutes of therapeutic activity/exercise and/or neuromuscular re-education while maintaining stable vitals to improve functional strength and activity tolerance within 3 days. LT. Patient will perform bed mobility with MINIMAL ASSISTANCE within 7 days. 2. Patient will transfer bed to chair with MODERATE ASSISTANCE x1 within 7 days. 3. Patient will demonstrate FAIR DYNAMIC STANDING balance within 7 day(s). 4. Patient will ambulate 10ft+ using least restrictive assistive device to assist with transfers with MODERATE ASSISTANCE x1 within 7 days. 5. Patient will tolerate 25+ minutes of therapeutic activity/exercise and/or neuromuscular re-education while maintaining stable vitals to improve functional strength and activity tolerance within 7 days. Will advance goals as patient is able. Outcome: Progressing Towards Goal       PHYSICAL THERAPY: Daily Note and PM 2020  INPATIENT: PT Visit Days : 2  Payor: Amanda Astudillo / Plan: [address] PPO / Product Type: PPO /      WBAT R LE; Hip Precautions  KI in bed/chair     NAME/AGE/GENDER: Juan Pablo Souza is a 80 y.o. female   PRIMARY DIAGNOSIS: Hip fracture ([facility] [address] [S72.009A] Hip fracture ([facility] [address] Hip fracture (HCC)  Procedure(s) (LRB):  RIGHT HIP HEMIARTHROPLASTY (Right)  2 Days Post-Op  ICD-10: Treatment Diagnosis:    · Difficulty in walking, Not elsewhere classified (R26.2)  · History of falling (Z91.81)   Precaution/Allergies:  Patient has no known allergies.       ASSESSMENT:     Ms. Jerod Rodriguez is a 80year old female admitted from [facility] s/p fall with resultant right femoral neck fracture. Patient is now 1 day s/p R hip hemiarthroplasty and is WBAT R LE with hip precautions and KI in bed/chair. Patient was sitting up in recliner chair upon contact. Confused with decreased/no command following. Patient requires total assist x 2 for all mobility including SPT back to bed and return to supine. Decreased weight bearing on BLE's. Poor tolerance of transfer. Overall slow progress towards physical therapy goals. Patient's goals listed above are still appropriate. Will continue skilled PT to address remaining deficits. This section established at most recent assessment   PROBLEM LIST (Impairments causing functional limitations):  1. Decreased Strength  2. Decreased ADL/Functional Activities  3. Decreased Transfer Abilities  4. Decreased Ambulation Ability/Technique  5. Decreased Balance  6. Increased Pain  7. Decreased Activity Tolerance  8. Decreased Flexibility/Joint Mobility  9. Decreased Knowledge of Precautions  10. Decreased Cognition   INTERVENTIONS PLANNED: (Benefits and precautions of physical therapy have been discussed with the patient.)  1. Balance Exercise  2. Bed Mobility  3. Family Education  4. Gait Training  5. Group Therapy  6. Home Exercise Program (HEP)  7. Neuromuscular Re-education/Strengthening  8. Range of Motion (ROM)  9. Therapeutic Activites  10. Therapeutic Exercise/Strengthening  11. Transfer Training     TREATMENT PLAN: Frequency/Duration: BID for duration of hospital stay  Rehabilitation Potential For Stated Goals: [facility] (at time of discharge pending progress):    Placement:   It is my opinion, based on this patient's performance to date, that Ms. Melisa Wise may benefit from intensive therapy at a [address] [place] [address] after discharge due to the functional deficits listed above that are likely to improve with skilled rehabilitation and concerns that he/she may be unsafe to be unsupervised at home due to decreased functional mobility and functional activity tolerance. Equipment:    TBD pending progress               HISTORY:   History of Present Injury/Illness (Reason for Referral):  S/po right hip hemiarthroplasty  Past Medical History/Comorbidities:   Ms. Maria Lerma  has a past medical history of Aortic stenosis and Aortic valve stenosis (5/12/2016). Ms. Maria Lerma  has no past surgical history on file. Social History/Living Environment:   Home Environment: Skilled nursing facility(Memory Care Unit)  [address] [place] [address] Name: [facility]: One story  Living Alone: No  Support Systems: 2 [facility]  Patient Expects to be Discharged to[de-identified] 2 [facility]  Current DME Used/Available at Home: Abdias Guaman, [address] chair  Prior Level of Function/Work/Activity:  Per chart review, patient is a resident of a memory care unit, requires assistance with ADLs, and unsure of ambulatory status. Dominant Side:         RIGHT   Number of Personal Factors/Comorbidities that affect the Plan of Care: 0: LOW COMPLEXITY   EXAMINATION:   Most Recent Physical Functioning:   Gross Assessment:                  Posture:     Balance:  Sitting: Impaired  Sitting - Static: Poor (constant support)  Standing: Impaired  Standing - Static: Poor  Standing - Dynamic : Poor Bed Mobility:  Supine to Sit: Total assistance;Assist x2  Sit to Supine: Total assistance;Assist x2  Wheelchair Mobility:     Transfers:  Sit to Stand: Total assistance;Assist x2  Stand to Sit: Total assistance;Assist x2  Bed to Chair: Total assistance;Assist x2  Gait:            Body Structures Involved:  1. Bones  2. Joints  3. Muscles Body Functions Affected:  1. Neuromusculoskeletal  2. Movement Related Activities and Participation Affected:  1. Mobility  2.  Self Care   Number of elements that affect the Plan of Care: 4+: HIGH COMPLEXITY   CLINICAL PRESENTATION:   Presentation: Evolving clinical presentation with changing clinical characteristics: MODERATE COMPLEXITY   CLINICAL DECISION MAKING:   [employer] AM-PAC 6 Clicks   Basic Mobility Inpatient Short Form  How much difficulty does the patient currently have. .. Unable A Lot A Little None   1. Turning over in bed (including adjusting bedclothes, sheets and blankets)? [] 1   [x] 2   [] 3   [] 4   2. Sitting down on and standing up from a chair with arms ( e.g., wheelchair, bedside commode, etc.)   [x] 1   [] 2   [] 3   [] 4   3. Moving from lying on back to sitting on the side of the bed? [] 1   [x] 2   [] 3   [] 4   How much help from another person does the patient currently need. .. Total A Lot A Little None   4. Moving to and from a bed to a chair (including a wheelchair)? [x] 1   [] 2   [] 3   [] 4   5. Need to walk in hospital room? [x] 1   [] 2   [] 3   [] 4   6. Climbing 3-5 steps with a railing? [x] 1   [] 2   [] 3   [] 4   © 2007, Trustees of [facility] [employer], under license to [employer]. All rights reserved      Score:  Initial: 8 Most Recent: 8 (Date: 2/24/2020 )    Interpretation of Tool:  Represents activities that are increasingly more difficult (i.e. Bed mobility, Transfers, Gait). Medical Necessity:     · Patient demonstrates   · fair  ·  rehab potential due to higher previous functional level. Reason for Services/Other Comments:  · Patient continues to require skilled intervention due to   · Decreased functional mobility and balance/gait status from baseline   · . Use of outcome tool(s) and clinical judgement create a POC that gives a: Clear prediction of patient's progress: LOW COMPLEXITY            TREATMENT:   (In addition to Assessment/Re-Assessment sessions the following treatments were rendered)   Pre-treatment Symptoms/Complaints:  None  Pain: Initial:   Pain Intensity 1: 0  Post Session:  0     Therapeutic Activity: (    8 Minutes):   Therapeutic activities including bed mobility training, sitting balance, posture training, command following SPT training, and patient education to improve mobility, strength and balance. Required maximal verbal, tactile and manual cues   to promote static and dynamic balance in sitting and promote coordination of bilateral, lower extremity(s). Braces/Orthotics/Lines/Etc:   · IV  · Purwick    · O2 Device: Nasal cannula  Treatment/Session Assessment:    · Response to Treatment:  See above. · Interdisciplinary Collaboration:   o Physical Therapy Assistant  o Registered Nurse  · After treatment position/precautions:   o Up in chair  o Bed alarm/tab alert on  o Bed/Chair-wheels locked  o Call light within reach  o RN notified   · Compliance with Program/Exercises: Will assess as treatment progresses  · Recommendations/Intent for next treatment session: \"Next visit will focus on advancements to more challenging activities and reduction in assistance provided\".   Total Treatment Duration:  PT Patient Time In/Time Out  Time In: 1340  Time Out: [facility]

## 2020-02-25 NOTE — PROGRESS NOTES
No beds available at [facility] or [facility]. [facility] has offered a bed for Thursday. SW attempted to reach pt's dtr/POA. No answer and unable to leave a voice mail message. BARBARA spoke with pt's son, who was at the bedside, and provided this information and requested that he share it with Margarita Stark. He verbalized understanding. Pt can transfer to [facility] on Thursday if she is medically cleared for discharge. 1045:  BARBARA spoke with pt's dtr, via phone. Confirmed acceptance of bed offer and plan. They are in agreement. SW following to facilitate pt's transfer to rehab at [facility].

## 2020-02-26 VITALS
TEMPERATURE: 97.5 F | WEIGHT: 122 LBS | DIASTOLIC BLOOD PRESSURE: 67 MMHG | HEART RATE: 86 BPM | OXYGEN SATURATION: 93 % | SYSTOLIC BLOOD PRESSURE: 113 MMHG | RESPIRATION RATE: 12 BRPM | HEIGHT: 59 IN | BODY MASS INDEX: 24.6 KG/M2

## 2020-02-26 PROBLEM — R09.02 HYPOXIA: Status: RESOLVED | Noted: 2020-02-26 | Resolved: 2020-02-26

## 2020-02-26 PROBLEM — I48.91 NEW ONSET ATRIAL FIBRILLATION (HCC): Status: ACTIVE | Noted: 2020-02-26

## 2020-02-26 PROBLEM — R09.02 HYPOXIA: Status: ACTIVE | Noted: 2020-02-26

## 2020-02-26 LAB
BASOPHILS # BLD: 0.1 K/UL (ref 0–0.2)
BASOPHILS NFR BLD: 1 % (ref 0–2)
DIFFERENTIAL METHOD BLD: ABNORMAL
EOSINOPHIL # BLD: 0.2 K/UL (ref 0–0.8)
EOSINOPHIL NFR BLD: 2 % (ref 0.5–7.8)
ERYTHROCYTE [DISTWIDTH] IN BLOOD BY AUTOMATED COUNT: 13.9 % (ref 11.9–14.6)
HCT VFR BLD AUTO: 30.6 % (ref 35.8–46.3)
HGB BLD-MCNC: 9.8 G/DL (ref 11.7–15.4)
IMM GRANULOCYTES # BLD AUTO: 0 K/UL (ref 0–0.5)
IMM GRANULOCYTES NFR BLD AUTO: 0 % (ref 0–5)
LYMPHOCYTES # BLD: 1.3 K/UL (ref 0.5–4.6)
LYMPHOCYTES NFR BLD: 15 % (ref 13–44)
MCH RBC QN AUTO: 32.6 PG (ref 26.1–32.9)
MCHC RBC AUTO-ENTMCNC: 32 G/DL (ref 31.4–35)
MCV RBC AUTO: 101.7 FL (ref 79.6–97.8)
MONOCYTES # BLD: 1.2 K/UL (ref 0.1–1.3)
MONOCYTES NFR BLD: 13 % (ref 4–12)
NEUTS SEG # BLD: 6 K/UL (ref 1.7–8.2)
NEUTS SEG NFR BLD: 69 % (ref 43–78)
NRBC # BLD: 0 K/UL (ref 0–0.2)
PLATELET # BLD AUTO: 208 K/UL (ref 150–450)
PMV BLD AUTO: 11.2 FL (ref 9.4–12.3)
RBC # BLD AUTO: 3.01 M/UL (ref 4.05–5.2)
WBC # BLD AUTO: 8.7 K/UL (ref 4.3–11.1)

## 2020-02-26 PROCEDURE — 74011250636 HC RX REV CODE- 250/636: Performed by: ORTHOPAEDIC SURGERY

## 2020-02-26 PROCEDURE — 97530 THERAPEUTIC ACTIVITIES: CPT

## 2020-02-26 PROCEDURE — 36415 COLL VENOUS BLD VENIPUNCTURE: CPT

## 2020-02-26 PROCEDURE — 77030038269 HC DRN EXT URIN PURWCK BARD -A

## 2020-02-26 PROCEDURE — 74011250637 HC RX REV CODE- 250/637: Performed by: INTERNAL MEDICINE

## 2020-02-26 PROCEDURE — 74011000250 HC RX REV CODE- 250: Performed by: INTERNAL MEDICINE

## 2020-02-26 PROCEDURE — 85025 COMPLETE CBC W/AUTO DIFF WBC: CPT

## 2020-02-26 PROCEDURE — 77030040393 HC DRSG OPTIFOAM GENT MDII -B

## 2020-02-26 PROCEDURE — 74011250637 HC RX REV CODE- 250/637: Performed by: ORTHOPAEDIC SURGERY

## 2020-02-26 PROCEDURE — 74011250636 HC RX REV CODE- 250/636: Performed by: INTERNAL MEDICINE

## 2020-02-26 RX ORDER — ASPIRIN 325 MG
325 TABLET ORAL DAILY
Qty: 30 TAB | Refills: 0 | Status: SHIPPED | OUTPATIENT
Start: 2020-02-26

## 2020-02-26 RX ORDER — MEMANTINE HYDROCHLORIDE 10 MG/1
10 TABLET ORAL 2 TIMES DAILY
Qty: 60 TAB | Refills: 1 | Status: SHIPPED | OUTPATIENT
Start: 2020-02-26

## 2020-02-26 RX ORDER — METOPROLOL TARTRATE 25 MG/1
12.5 TABLET, FILM COATED ORAL EVERY 12 HOURS
Qty: 60 TAB | Refills: 1 | Status: SHIPPED | OUTPATIENT
Start: 2020-02-26

## 2020-02-26 RX ADMIN — Medication 1 TABLET: at 12:25

## 2020-02-26 RX ADMIN — METOPROLOL TARTRATE 12.5 MG: 25 TABLET, FILM COATED ORAL at 09:34

## 2020-02-26 RX ADMIN — MEMANTINE 10 MG: 5 TABLET ORAL at 09:34

## 2020-02-26 RX ADMIN — FAMOTIDINE 20 MG: 10 INJECTION INTRAVENOUS at 12:24

## 2020-02-26 RX ADMIN — Medication 5 ML: at 06:44

## 2020-02-26 RX ADMIN — ACETAMINOPHEN 650 MG: 325 TABLET, FILM COATED ORAL at 12:26

## 2020-02-26 RX ADMIN — Medication 1 TABLET: at 09:34

## 2020-02-26 RX ADMIN — ENOXAPARIN SODIUM 30 MG: 30 INJECTION SUBCUTANEOUS at 09:34

## 2020-02-26 NOTE — PROGRESS NOTES
Problem: Mobility Impaired (Adult and Pediatric)  Goal: *Acute Goals and Plan of Care (Insert Text)  Description  ST. Patient will perform bed mobility with MODERATE ASSISTANCE within 3 days. 2. Patient will transfer bed to chair with MODERATE ASSISTANCE x2 within 3 days. 3. Patient will demonstrate GOOD DYNAMIC SITTING balance within 3 day(s). 4. Patient will tolerate 15+ minutes of therapeutic activity/exercise and/or neuromuscular re-education while maintaining stable vitals to improve functional strength and activity tolerance within 3 days. LT. Patient will perform bed mobility with MINIMAL ASSISTANCE within 7 days. 2. Patient will transfer bed to chair with MODERATE ASSISTANCE x1 within 7 days. 3. Patient will demonstrate FAIR DYNAMIC STANDING balance within 7 day(s). 4. Patient will ambulate 10ft+ using least restrictive assistive device to assist with transfers with MODERATE ASSISTANCE x1 within 7 days. 5. Patient will tolerate 25+ minutes of therapeutic activity/exercise and/or neuromuscular re-education while maintaining stable vitals to improve functional strength and activity tolerance within 7 days. Will advance goals as patient is able. Outcome: Progressing Towards Goal       PHYSICAL THERAPY: Daily Note and AM 2020  INPATIENT: PT Visit Days : 3  Payor: Janette Muhammad / Plan: [address] PPO / Product Type: PPO /      WBAT R LE; Hip Precautions  KI in bed/chair     NAME/AGE/GENDER: Edgard Nielsen is a 80 y.o. female   PRIMARY DIAGNOSIS: Hip fracture ([facility] [address] [S72.009A] Hip fracture ([facility] [address] Hip fracture (HCC)  Procedure(s) (LRB):  RIGHT HIP HEMIARTHROPLASTY (Right)  3 Days Post-Op  ICD-10: Treatment Diagnosis:    · Difficulty in walking, Not elsewhere classified (R26.2)  · History of falling (Z91.81)   Precaution/Allergies:  Patient has no known allergies.       ASSESSMENT:     Ms. Doretha Ny is a 80year old female admitted from [facility] s/p fall with resultant right femoral neck fracture. Patient is now 1 day s/p R hip hemiarthroplasty and is WBAT R LE with hip precautions and KI in bed/chair. Patient was supine upon contact and agreeable to PT. Pleasantly confused with delayed/decreased command following. Knee immobilizer removed for treatment. Patient participates in rolling left and right which she required total assist to perform despite max cueing for participation/technique. Patient maintains sidelying while CNA cleans patient up in preparation for transfer to recliner chair. Patient then performs supine to sit with total assist x 2. Patient sits EOB x approximately 10 minutes working on sitting balance tolerance, posture, and to get prepared for transfer. Again, provided max cueing for improved participation/technique to assist with transfer but patient ultimately required total assist x 2 for SPT to recliner chair. Improved weight bearing noted in BLE's during transfer. Patient tolerated light ROM to BLE's well without too many complaints. Knee immobilizer donned post treatment. Overall slow progress towards physical therapy goals. Patient's goals listed above are still appropriate. Will continue skilled PT to address remaining deficits. This section established at most recent assessment   PROBLEM LIST (Impairments causing functional limitations):  1. Decreased Strength  2. Decreased ADL/Functional Activities  3. Decreased Transfer Abilities  4. Decreased Ambulation Ability/Technique  5. Decreased Balance  6. Increased Pain  7. Decreased Activity Tolerance  8. Decreased Flexibility/Joint Mobility  9. Decreased Knowledge of Precautions  10. Decreased Cognition   INTERVENTIONS PLANNED: (Benefits and precautions of physical therapy have been discussed with the patient.)  1. Balance Exercise  2. Bed Mobility  3. Family Education  4. Gait Training  5. Group Therapy  6.  Home Exercise Program (HEP)  7. Neuromuscular Re-education/Strengthening  8. Range of Motion (ROM)  9. Therapeutic Activites  10. Therapeutic Exercise/Strengthening  11. Transfer Training     TREATMENT PLAN: Frequency/Duration: BID for duration of hospital stay  Rehabilitation Potential For Stated Goals: [facility] (at time of discharge pending progress):    Placement: It is my opinion, based on this patient's performance to date, that Ms. Michelle Dumont may benefit from intensive therapy at a [address] [place] [address] after discharge due to the functional deficits listed above that are likely to improve with skilled rehabilitation and concerns that he/she may be unsafe to be unsupervised at home due to decreased functional mobility and functional activity tolerance. Equipment:    TBD pending progress               HISTORY:   History of Present Injury/Illness (Reason for Referral):  S/po right hip hemiarthroplasty  Past Medical History/Comorbidities:   Ms. Michelle Dumont  has a past medical history of Aortic stenosis and Aortic valve stenosis (5/12/2016). Ms. Michelle Dumont  has no past surgical history on file. Social History/Living Environment:   Home Environment: Skilled nursing facility(Memory Care Unit)  [address] [place] [address] Name: [facility]: One story  Living Alone: No  Support Systems: 2 [facility]  Patient Expects to be Discharged to[de-identified] 2 [facility]  Current DME Used/Available at Home: Manjit Canales, [facility] chair  Prior Level of Function/Work/Activity:  Per chart review, patient is a resident of a memory care unit, requires assistance with ADLs, and unsure of ambulatory status.    Dominant Side:         RIGHT   Number of Personal Factors/Comorbidities that affect the Plan of Care: 0: LOW COMPLEXITY   EXAMINATION:   Most Recent Physical Functioning:   Gross Assessment:                  Posture:     Balance:  Sitting: Impaired  Sitting - Static: Poor (constant support)  Sitting - Dynamic: Poor (constant support)  Standing: Impaired  Standing - Static: Poor  Standing - Dynamic : Poor Bed Mobility:  Rolling: Total assistance  Supine to Sit: Total assistance;Assist x2  Sit to Supine: Total assistance;Assist x2  Wheelchair Mobility:     Transfers:  Sit to Stand: Total assistance;Assist x2  Stand to Sit: Total assistance;Assist x2  Bed to Chair: Total assistance;Assist x2  Gait:            Body Structures Involved:  1. Bones  2. Joints  3. Muscles Body Functions Affected:  1. Neuromusculoskeletal  2. Movement Related Activities and Participation Affected:  1. Mobility  2. Self Care   Number of elements that affect the Plan of Care: 4+: HIGH COMPLEXITY   CLINICAL PRESENTATION:   Presentation: Evolving clinical presentation with changing clinical characteristics: MODERATE COMPLEXITY   CLINICAL DECISION MAKIN [facility] Mobility Inpatient Short Form  How much difficulty does the patient currently have. .. Unable A Lot A Little None   1. Turning over in bed (including adjusting bedclothes, sheets and blankets)? [] 1   [x] 2   [] 3   [] 4   2. Sitting down on and standing up from a chair with arms ( e.g., wheelchair, bedside commode, etc.)   [x] 1   [] 2   [] 3   [] 4   3. Moving from lying on back to sitting on the side of the bed? [] 1   [x] 2   [] 3   [] 4   How much help from another person does the patient currently need. .. Total A Lot A Little None   4. Moving to and from a bed to a chair (including a wheelchair)? [x] 1   [] 2   [] 3   [] 4   5. Need to walk in hospital room? [x] 1   [] 2   [] 3   [] 4   6. Climbing 3-5 steps with a railing? [x] 1   [] 2   [] 3   [] 4   © , Trustees of [address] [place] [address] [address] [address], under license to [employer]. All rights reserved      Score:  Initial: 8 Most Recent: 8 (Date: 2020 )    Interpretation of Tool:  Represents activities that are increasingly more difficult (i.e. Bed mobility, Transfers, Gait).     Medical Necessity: · Patient demonstrates   · fair  ·  rehab potential due to higher previous functional level. Reason for Services/Other Comments:  · Patient continues to require skilled intervention due to   · Decreased functional mobility and balance/gait status from baseline   · . Use of outcome tool(s) and clinical judgement create a POC that gives a: Clear prediction of patient's progress: LOW COMPLEXITY            TREATMENT:   (In addition to Assessment/Re-Assessment sessions the following treatments were rendered)   Pre-treatment Symptoms/Complaints:  None  Pain: Initial:   Pain Intensity 1: 0  Post Session:  0     Therapeutic Activity: (    25 Minutes): Therapeutic activities including bed mobility training including rolling L<->R, maintaining side lying, and supine to sit, static/dynamic sitting balance, scooting, posture training, command following, patient education, and gentle ROM to BLE's to improve mobility, strength and balance. Required maximal verbal, tactile and manual cues   to promote static and dynamic balance in sitting, promote coordination of bilateral, upper extremity(s), lower extremity(s) and promote motor control of bilateral, upper extremity(s), lower extremity(s). Braces/Orthotics/Lines/Etc:   · IV  · Purwick    · O2 Device: Nasal cannula  Treatment/Session Assessment:    · Response to Treatment:  See above. · Interdisciplinary Collaboration:   o Physical Therapy Assistant  o Registered Nurse  · After treatment position/precautions:   o Up in chair  o Bed alarm/tab alert on  o Bed/Chair-wheels locked  o Call light within reach  o RN notified  o Family at bedside   · Compliance with Program/Exercises: Will assess as treatment progresses  · Recommendations/Intent for next treatment session: \"Next visit will focus on advancements to more challenging activities and reduction in assistance provided\".   Total Treatment Duration:  PT Patient Time In/Time Out  Time In: 1056  [facility]

## 2020-02-26 NOTE — PROGRESS NOTES
Progress Note    Patient: Romayne Kenning MRN: [id number]  SSN: [id number]    YOB: 1930  Age: 80 y.o. Sex: female      Admit Date: 2/23/2020    LOS: 3 days     Subjective:     Patient is arousable    Objective:     Vitals:    02/25/20 1732 02/25/20 2034 02/26/20 0012 02/26/20 0414   BP:  128/68 144/73 145/74   Pulse:  (!) 104 86 92   Resp:  17 18 18   Temp:  99.1 °F (37.3 °C) 97.5 °F (36.4 °C) 97.5 °F (36.4 °C)   SpO2: 100% 95% 96% 94%   Weight:       Height:            Intake and Output:  Current Shift: No intake/output data recorded. Last three shifts: 02/24 1901 - 02/26 0700  In: 30 [P.O.:30]  Out: 125 [Urine:125]    arousable but not oriented  Skin - incision is well healed with no redness or drainage  Motor and sensory function intact in RIGHT LOWER extremity  Pulses palpable in RIGHT LOWER extremity       Lab/Data Review:  CBC:   Lab Results   Component Value Date/Time    WBC 8.7 02/26/2020 06:04 AM    HGB 9.8 (L) 02/26/2020 06:04 AM    HCT 30.6 (L) 02/26/2020 06:04 AM     02/26/2020 06:04 AM          Assessment:     Acute postop blood loss anemia with hemoglobin stable at 9.8, POD 3 from right hip hemiarthroplasty    Plan:     Patient may be weightbearing as tolerated on the right lower extremity. They can be full active and passive range of motion of the right hip. They can have dry dressing change starting on postoperative day 2 and then after that daily and as needed. They will need aspirin 325 mg 1 p.o. daily for 4 weeks as DVT prophylaxis as well as SCDs while hospitalized. They will need to have orthopedic follow-up approximately 2 weeks after discharge.     Signed By: Roxy Xiong MD     February 26, 2020

## 2020-02-26 NOTE — DISCHARGE SUMMARY
Discharge Summary     Patient: Jose Cruz Zambrano MRN: [id number]  SSN: [id number]    YOB: 1930  Age: 80 y.o. Sex: female       Admit Date: 2/23/2020    Discharge Date: 2/26/2020      Admission Diagnoses: Hip fracture [facility]) Collin Stein    Discharge Diagnoses:   Problem List as of 2/26/2020 Date Reviewed: 7/14/2017          Codes Class Noted - Resolved    New onset atrial fibrillation [facility]) ICD-10-CM: I48.91  ICD-9-CM: 427.31  2/26/2020 - Present        * (Principal) Hip fracture ([facility] ICD-10-CM: S72.009A  ICD-9-CM: 820.8  2/23/2020 - Present        Dementia ([facility] ICD-10-CM: F03.90  ICD-9-CM: 294.20  2/23/2020 - Present        Aortic stenosis ICD-10-CM: I35.0  ICD-9-CM: 424.1  5/12/2016 - Present        RESOLVED: Hypoxia ICD-10-CM: R09.02  ICD-9-CM: 799.02  2/26/2020 - 2/26/2020               Discharge Condition: Stable    Hospital Course: This is an 77-year-old female patient with a past medical history of dementia, aortic stenosis, resident of a nursing home who had a mechanical fall on 2/22/2020. Pam Lints the fall it was noticed that the patient was not able to move the right lower extremity and it was internally rotated and shortened.  An x-ray was done today and reported a right hip fracture.  The patient was sent into the emergency room to be evaluated here. SHe was found to have a fracture of the neck of the right femur. Due to her age she was felt high risk for surgery. Family agreed for surgical correction. She had a R hip replacement done on 2/23/20. She remains very weak after the surgery. Tends to lean towards her left side while on bed,  which according to her family is her baseline.    On 2/24 her telemetry reported on- off Afib/ sinus tachycardia. HR between  bpm. Patient asymtomatic. She is NOT a candidate for anticoagulation. Started on lopressor bid. Ordered IV lopressor prn. EKG showed sinus tachy with APCs. Chest x ray with LLL atelectasis vs infiltrate. Will monitor. Prognosis is guarded. Family aware. She is DNR. Patient was weaned off of supplemental oxygen to room air and was saturating well. PT recommended SNF placement and placement was arranged per CM. I discussed the discharge plan with the son at length and all questions were answered. Patient's son voiced understanding and agreement. I counseled family that given patient's comorbid conditions and age that she may never fully recover and if so it would be a long process.      Physical Exam:   GENERAL: alert, appears stated age  EYE: conjunctivae/corneas clear. LYMPHATIC: Cervical, supraclavicular, and axillary nodes normal.   THROAT & NECK: normal and no erythema or exudates noted. LUNG: clear to auscultation bilaterally  HEART: regular rate and rhythm, S1, S2 normal, no murmur, click, rub or gallop  ABDOMEN: soft, non-tender. Bowel sounds normal. No masses,  no organomegaly  EXTREMITIES: S/P R hip replacement   NEUROLOGIC: No focal deficits     Consults: Orthopedics    Significant Diagnostic Studies:     Right hip, right femur, and chest x-ray     Clinical location right hip pain after a fall     FINDINGS:     Right hip: A bases cervical right proximal femoral neck fracture is appreciated  with superior migration of the diaphyseal fragment and varus angulation.     Right femur: Four views of the right femur again show the right proximal femoral  neck fracture. No mid or distal femur fracture evident. The soft tissues are  unremarkable.     Portable chest x-ray: The cardiac silhouette is prominent. The lungs are  expanded and clear. No pleural effusion or pneumothorax. The bones are  osteopenic.     IMPRESSION  IMPRESSION:  1. Right proximal femur basicervical femoral neck fracture. 2. No acute cardiopulmonary abnormality. Cardiomegaly suspected.     3 VIEW PORTABLE RIGHT HIP, February 24, 2020 at 1543 hours:     CLINICAL HISTORY:  Followup right hip arthroplasty today.     COMPARISON:  February 23, 2020.     FINDINGS:  AP radiograph of the lower pelvis and hips as well as coned-down AP  and crosstable lateral images of the right hip demonstrate right lateral skin  staples and soft tissue gas. Right bipolar hip prosthesis is in place. No  acute fracture, malalignment, or aggressive bone destruction is evident. Left  hip arthroplasty appears unchanged.     IMPRESSION  IMPRESSION:  NEW RIGHT BIPOLAR HIP PROSTHESIS WITH COMPONENTS IN EXPECTED  POSITIONS. Disposition: SNF    Discharge Medications:   Current Discharge Medication List      START taking these medications    Details   memantine (NAMENDA) 10 mg tablet Take 1 Tab by mouth two (2) times a day. Qty: 60 Tab, Refills: 1      aspirin (ASPIRIN) 325 mg tablet Take 1 Tab by mouth daily. Qty: 30 Tab, Refills: 0      metoprolol tartrate (LOPRESSOR) 25 mg tablet Take 0.5 Tabs by mouth every twelve (12) hours. Qty: 60 Tab, Refills: 1         STOP taking these medications       memantine ER (NAMENDA XR) 28 mg capsule Comments:   Reason for Stopping:         donepezil (ARICEPT) 10 mg tablet Comments:   Reason for Stopping:         risedronate (ACTONEL) 150 mg tablet Comments:   Reason for Stopping:               Activity: Activity as tolerated  Diet: Regular Diet  Wound Care: Keep incision site clean and dry,  Recommend allevyn over sacral DTI changed every three days and as needed. Also frequent turning and repositioning.     Follow-up Appointments   Procedures    FOLLOW UP VISIT Appointment in: 6 Weeks Orthopedic surgery     Orthopedic surgery     Standing Status:   Standing     Number of Occurrences:   1     Order Specific Question:   Appointment in     Answer:   6 Weeks       Signed By: Rodrigo Ramírez MD     February 26, 2020

## 2020-02-26 NOTE — ROUTINE PROCESS
Patient has not voided on day shift today. Bladder scan results 334 ml. Per MD Shayla Robertson no need for I/O cath or jones to be placed.

## 2020-02-26 NOTE — PROGRESS NOTES
Problem: Mobility Impaired (Adult and Pediatric)  Goal: *Acute Goals and Plan of Care (Insert Text)  Description  ST. Patient will perform bed mobility with MODERATE ASSISTANCE within 3 days. 2. Patient will transfer bed to chair with MODERATE ASSISTANCE x2 within 3 days. 3. Patient will demonstrate GOOD DYNAMIC SITTING balance within 3 day(s). 4. Patient will tolerate 15+ minutes of therapeutic activity/exercise and/or neuromuscular re-education while maintaining stable vitals to improve functional strength and activity tolerance within 3 days. LT. Patient will perform bed mobility with MINIMAL ASSISTANCE within 7 days. 2. Patient will transfer bed to chair with MODERATE ASSISTANCE x1 within 7 days. 3. Patient will demonstrate FAIR DYNAMIC STANDING balance within 7 day(s). 4. Patient will ambulate 10ft+ using least restrictive assistive device to assist with transfers with MODERATE ASSISTANCE x1 within 7 days. 5. Patient will tolerate 25+ minutes of therapeutic activity/exercise and/or neuromuscular re-education while maintaining stable vitals to improve functional strength and activity tolerance within 7 days. Will advance goals as patient is able. Outcome: Progressing Towards Goal       PHYSICAL THERAPY: Daily Note and PM 2020  INPATIENT: PT Visit Days : 3  Payor: Ella Argue / Plan: [address] PPO / Product Type: PPO /      WBAT R LE; Hip Precautions  KI in bed/chair     NAME/AGE/GENDER: Wendy Win is a 80 y.o. female   PRIMARY DIAGNOSIS: Hip fracture ([facility] [S72.009A] Hip fracture ([facility] Hip fracture (HCC)  Procedure(s) (LRB):  RIGHT HIP HEMIARTHROPLASTY (Right)  3 Days Post-Op  ICD-10: Treatment Diagnosis:    · Difficulty in walking, Not elsewhere classified (R26.2)  · History of falling (Z91.81)   Precaution/Allergies:  Patient has no known allergies.       ASSESSMENT:     Ms. Bryant Edi is a 80year old female admitted from [facility] s/p fall with resultant right femoral neck fracture. Patient is now 1 day s/p R hip hemiarthroplasty and is WBAT R LE with hip precautions and KI in bed/chair. Patient was sitting up in recliner chair upon contact and agreeable to PT. Pleasantly confused with delayed/decreased command following. Knee immobilizer removed for treatment. Provided max cueing for improved participation/technique to assist with transfer but patient ultimately required total assist x 2 for SPT back to bed. Improved weight bearing noted in BLE's during transfer. Patient returns to supine with total assist x 2. Overall slow progress towards physical therapy goals. Patient's goals listed above are still appropriate. Will continue skilled PT to address remaining deficits. This section established at most recent assessment   PROBLEM LIST (Impairments causing functional limitations):  1. Decreased Strength  2. Decreased ADL/Functional Activities  3. Decreased Transfer Abilities  4. Decreased Ambulation Ability/Technique  5. Decreased Balance  6. Increased Pain  7. Decreased Activity Tolerance  8. Decreased Flexibility/Joint Mobility  9. Decreased Knowledge of Precautions  10. Decreased Cognition   INTERVENTIONS PLANNED: (Benefits and precautions of physical therapy have been discussed with the patient.)  1. Balance Exercise  2. Bed Mobility  3. Family Education  4. Gait Training  5. Group Therapy  6. Home Exercise Program (HEP)  7. Neuromuscular Re-education/Strengthening  8. Range of Motion (ROM)  9. Therapeutic Activites  10. Therapeutic Exercise/Strengthening  11. Transfer Training     TREATMENT PLAN: Frequency/Duration: BID for duration of hospital stay  Rehabilitation Potential For Stated Goals: [facility] (at time of discharge pending progress):    Placement:   It is my opinion, based on this patient's performance to date, that Ms. Yusef Diallo may benefit from intensive therapy at a [address] [place] [address] after discharge due to the functional deficits listed above that are likely to improve with skilled rehabilitation and concerns that he/she may be unsafe to be unsupervised at home due to decreased functional mobility and functional activity tolerance. Equipment:    TBD pending progress               HISTORY:   History of Present Injury/Illness (Reason for Referral):  S/po right hip hemiarthroplasty  Past Medical History/Comorbidities:   Ms. Luis Morejon  has a past medical history of Aortic stenosis and Aortic valve stenosis (5/12/2016). Ms. Luis Morejon  has no past surgical history on file. Social History/Living Environment:   Home Environment: Skilled nursing facility(Memory Care Unit)  [address] [place] [address] Name: [facility]: One story  Living Alone: No  Support Systems: 2 [facility]  Patient Expects to be Discharged to[de-identified] 2 [facility]  Current DME Used/Available at Home: Braden Canavan, [address] [place] chair  Prior Level of Function/Work/Activity:  Per chart review, patient is a resident of a memory care unit, requires assistance with ADLs, and unsure of ambulatory status. Dominant Side:         RIGHT   Number of Personal Factors/Comorbidities that affect the Plan of Care: 0: LOW COMPLEXITY   EXAMINATION:   Most Recent Physical Functioning:   Gross Assessment:                  Posture:     Balance:  Sitting: Impaired  Sitting - Static: Poor (constant support)  Sitting - Dynamic: Poor (constant support)  Standing: Impaired  Standing - Static: Poor  Standing - Dynamic : Poor Bed Mobility:  Rolling: Total assistance  Supine to Sit: Total assistance;Assist x2  Sit to Supine: Total assistance;Assist x2  Wheelchair Mobility:     Transfers:  Sit to Stand: Total assistance;Assist x2  Stand to Sit: Total assistance;Assist x2  Bed to Chair: Total assistance;Assist x2  Gait:            Body Structures Involved:  1. Bones  2. Joints  3.  Muscles Body Functions Affected:  1. Neuromusculoskeletal  2. Movement Related Activities and Participation Affected:  1. Mobility  2. Self Care   Number of elements that affect the Plan of Care: 4+: HIGH COMPLEXITY   CLINICAL PRESENTATION:   Presentation: Evolving clinical presentation with changing clinical characteristics: MODERATE COMPLEXITY   CLINICAL DECISION MAKIN [facility] Mobility Inpatient Short Form  How much difficulty does the patient currently have. .. Unable A Lot A Little None   1. Turning over in bed (including adjusting bedclothes, sheets and blankets)? [] 1   [x] 2   [] 3   [] 4   2. Sitting down on and standing up from a chair with arms ( e.g., wheelchair, bedside commode, etc.)   [x] 1   [] 2   [] 3   [] 4   3. Moving from lying on back to sitting on the side of the bed? [] 1   [x] 2   [] 3   [] 4   How much help from another person does the patient currently need. .. Total A Lot A Little None   4. Moving to and from a bed to a chair (including a wheelchair)? [x] 1   [] 2   [] 3   [] 4   5. Need to walk in hospital room? [x] 1   [] 2   [] 3   [] 4   6. Climbing 3-5 steps with a railing? [x] 1   [] 2   [] 3   [] 4   © , Trustees of [facility] [employer], under license to [employer]. All rights reserved      Score:  Initial: 8 Most Recent: 8 (Date: 2020 )    Interpretation of Tool:  Represents activities that are increasingly more difficult (i.e. Bed mobility, Transfers, Gait). Medical Necessity:     · Patient demonstrates   · fair  ·  rehab potential due to higher previous functional level. Reason for Services/Other Comments:  · Patient continues to require skilled intervention due to   · Decreased functional mobility and balance/gait status from baseline   · .    Use of outcome tool(s) and clinical judgement create a POC that gives a: Clear prediction of patient's progress: LOW COMPLEXITY            TREATMENT:   (In addition to Assessment/Re-Assessment sessions the following treatments were rendered)   Pre-treatment Symptoms/Complaints:  None  Pain: Initial:   Pain Intensity 1: 0  Post Session:  0     Therapeutic Activity: (    15 Minutes): Therapeutic activities including bed mobility training, static/dynamic sitting balance, scooting, posture training, command following, and patient education to improve mobility, strength and balance. Required maximal verbal, tactile and manual cues   to promote static and dynamic balance in sitting, promote coordination of bilateral, upper extremity(s), lower extremity(s) and promote motor control of bilateral, upper extremity(s), lower extremity(s). Braces/Orthotics/Lines/Etc:   · IV  · [place]    · O2 Device: Nasal cannula  Treatment/Session Assessment:    · Response to Treatment:  See above. · Interdisciplinary Collaboration:   o Physical Therapy Assistant  o Registered Nurse  · After treatment position/precautions:   o Supine in bed  o Bed alarm/tab alert on  o Bed/Chair-wheels locked  o Bed in low position  o Call light within reach  o RN notified   · Compliance with Program/Exercises: Will assess as treatment progresses  · Recommendations/Intent for next treatment session: \"Next visit will focus on advancements to more challenging activities and reduction in assistance provided\".   Total Treatment Duration:  PT Patient Time In/Time Out  Time In: 1336  Time Out: 35[address] Ryan Gonzalez PTA

## 2020-02-26 NOTE — WOUND CARE
Pt seen for concern on sacral area. Noted documented on admission sacrum red but blanchable and healed abrasion on left buttock. Pt turned with PT to right side, noted below sacrum purple discoloration consistent with DTI  Measuring 0.5 x 1 cm with surrounding blanchable erythema. Intact blister to right buttock and below is another area of purple discoloration also consistent with DTI measuring 1 x 1 cm. Most likely due to shear/friction combination since not over bony prominence . Recommend allevyn over areas changed every three days and as needed. Also frequent turning and repositioning. Noted discharge summary put in. Updated Dr. Jessica Marr on DTI's. Wound team will continue to follow while in acute care setting.

## 2020-02-26 NOTE — PROGRESS NOTES
Pt is medically cleared for discharge today and will transfer to room 206D at [facility] for [facility]. Transport is scheduled for 1515 through [facility]. RN to call report to [phone]. SW spoke with pt's dtr, Henna Vela, to notify her of pt's dc and transport time. SW remains available to assist as needed. Care Management Interventions  PCP Verified by CM: Yes(NP/MD inhouse of [facility]. )  Mode of Transport at Discharge: BLS([facility])  Hospital Transport Time of Discharge: [address] [place] [address] (CM Consult): SNF  Partner SNF: Yes  Discharge Durable Medical Equipment: No(Pt's daughter confirmed DME, such as standard walker, and showerchair. )  Physical Therapy Consult: Yes  Occupational Therapy Consult: Yes  Speech Therapy Consult: No  Current Support Network: Other(The patient is a resident of  [place] [facility] ([address].))  Confirm Follow Up Transport: Other (see comment)([place])  The Plan for Transition of Care is Related to the Following Treatment Goals : Pt to obtain care to become medically stable for discharge and to assist with STR to improve physical mobility.    The Patient and/or Patient Representative was Provided with a Choice of Provider and Agrees with the Discharge Plan?: Yes  Name of the Patient Representative Who was Provided with a Choice of Provider and Agrees with the Discharge Plan: Pt's daughter assisted with Ruthann Snellen of Choice List was Provided with Basic Dialogue that Supports the Patient's Individualized Plan of Care/Goals, Treatment Preferences and Shares the Quality Data Associated with the Providers?: Yes  [employer] Information Provided?: No  Discharge Location  Discharge Placement: Rehab Unit [place] [facility] and Rehab)

## 2020-02-27 ENCOUNTER — PATIENT OUTREACH (OUTPATIENT)
Dept: CASE MANAGEMENT | Age: 85
End: 2020-02-27

## 2020-02-27 NOTE — PROGRESS NOTES
This note will not be viewable in [address] Patient discharged to [facility] on 2/26/20. Patient discharged to a [facility] Preferred Provider Network facility. Patient will be included in weekly care coordination calls. Information forwarded to Lupis Justin RN, [facility] RN Care Manager.

## (undated) DEVICE — SUTURE 5 MERS GRN 30 TO 40 IN D9211

## (undated) DEVICE — DRAPE TBL W72XH34IN D30IN SGL PC DISPOSABLE

## (undated) DEVICE — FAN SPRAY KIT: Brand: PULSAVAC®

## (undated) DEVICE — HOOD: Brand: FLYTE

## (undated) DEVICE — HIP HEMIARTHROPLASTY: Brand: MEDLINE INDUSTRIES, INC.

## (undated) DEVICE — REM POLYHESIVE ADULT PATIENT RETURN ELECTRODE: Brand: VALLEYLAB

## (undated) DEVICE — 1010 S-DRAPE TOWEL DRAPE 10/BX: Brand: STERI-DRAPE™

## (undated) DEVICE — 2000CC GUARDIAN II: Brand: GUARDIAN

## (undated) DEVICE — SOLUTION IV 1000ML 0.9% SOD CHL

## (undated) DEVICE — 2108 SERIES SAGITTAL BLADE, NO OFFSET  (24.8 X 1.27 X 82.1MM)

## (undated) DEVICE — IMMOBILIZER KNEE 3 PNL 19 IN

## (undated) DEVICE — (D)PREP SKN CHLRAPRP APPL 26ML -- CONVERT TO ITEM 371833

## (undated) DEVICE — ADHESIVE SKIN CLOSURE 4X22 CM PREMIERPRO EXOFINFUSION DISP

## (undated) DEVICE — SUTURE MCRYL SZ 3-0 L27IN ABSRB UD L19MM PS-2 3/8 CIR PRIM Y427H

## (undated) DEVICE — INTENDED FOR TISSUE SEPARATION, AND OTHER PROCEDURES THAT REQUIRE A SHARP SURGICAL BLADE TO PUNCTURE OR CUT.: Brand: BARD-PARKER ® STAINLESS STEEL BLADES

## (undated) DEVICE — DRAPE,U/SHT,SPLIT,FILM,60X84,STERILE: Brand: MEDLINE

## (undated) DEVICE — APPLICATOR BNDG 1MM ADH PREMIERPRO EXOFIN

## (undated) DEVICE — DRAPE,HIP,W/POUCHES,STERILE: Brand: MEDLINE

## (undated) DEVICE — Device

## (undated) DEVICE — 3000CC GUARDIAN II: Brand: GUARDIAN

## (undated) DEVICE — SOLUTION IRRIG 3000ML 0.9% SOD CHL FLX CONT 0797208] ICU MEDICAL INC]

## (undated) DEVICE — SUTURE VCRL SZ 0 L36IN ABSRB UD L36MM CT-1 1/2 CIR J946H